# Patient Record
Sex: MALE | Race: WHITE | HISPANIC OR LATINO | Employment: FULL TIME | URBAN - METROPOLITAN AREA
[De-identification: names, ages, dates, MRNs, and addresses within clinical notes are randomized per-mention and may not be internally consistent; named-entity substitution may affect disease eponyms.]

---

## 2021-11-28 ENCOUNTER — APPOINTMENT (OUTPATIENT)
Dept: RADIOLOGY | Facility: MEDICAL CENTER | Age: 51
DRG: 417 | End: 2021-11-28
Attending: STUDENT IN AN ORGANIZED HEALTH CARE EDUCATION/TRAINING PROGRAM
Payer: COMMERCIAL

## 2021-11-28 ENCOUNTER — APPOINTMENT (OUTPATIENT)
Dept: RADIOLOGY | Facility: MEDICAL CENTER | Age: 51
DRG: 417 | End: 2021-11-28
Attending: EMERGENCY MEDICINE
Payer: COMMERCIAL

## 2021-11-28 ENCOUNTER — HOSPITAL ENCOUNTER (INPATIENT)
Facility: MEDICAL CENTER | Age: 51
LOS: 5 days | DRG: 417 | End: 2021-12-03
Attending: EMERGENCY MEDICINE | Admitting: HOSPITALIST
Payer: COMMERCIAL

## 2021-11-28 DIAGNOSIS — K81.9 CHOLECYSTITIS: ICD-10-CM

## 2021-11-28 PROBLEM — R73.9 HYPERGLYCEMIA: Status: ACTIVE | Noted: 2021-11-28

## 2021-11-28 PROBLEM — E66.01 MORBID OBESITY (HCC): Status: ACTIVE | Noted: 2021-11-28

## 2021-11-28 PROBLEM — R74.01 TRANSAMINITIS: Status: ACTIVE | Noted: 2021-11-28

## 2021-11-28 LAB
ALBUMIN SERPL BCP-MCNC: 4.5 G/DL (ref 3.2–4.9)
ALBUMIN/GLOB SERPL: 1.1 G/DL
ALP SERPL-CCNC: 307 U/L (ref 30–99)
ALT SERPL-CCNC: 329 U/L (ref 2–50)
ANION GAP SERPL CALC-SCNC: 12 MMOL/L (ref 7–16)
AST SERPL-CCNC: 476 U/L (ref 12–45)
BASOPHILS # BLD AUTO: 0.2 % (ref 0–1.8)
BASOPHILS # BLD: 0.02 K/UL (ref 0–0.12)
BILIRUB CONJ SERPL-MCNC: 4 MG/DL (ref 0.1–0.5)
BILIRUB INDIRECT SERPL-MCNC: 1.5 MG/DL (ref 0–1)
BILIRUB SERPL-MCNC: 5.5 MG/DL (ref 0.1–1.5)
BLOOD CULTURE HOLD CXBCH: NORMAL
BUN SERPL-MCNC: 12 MG/DL (ref 8–22)
CALCIUM SERPL-MCNC: 10 MG/DL (ref 8.5–10.5)
CHLORIDE SERPL-SCNC: 96 MMOL/L (ref 96–112)
CO2 SERPL-SCNC: 22 MMOL/L (ref 20–33)
CREAT SERPL-MCNC: 0.72 MG/DL (ref 0.5–1.4)
EKG IMPRESSION: NORMAL
EOSINOPHIL # BLD AUTO: 0 K/UL (ref 0–0.51)
EOSINOPHIL NFR BLD: 0 % (ref 0–6.9)
ERYTHROCYTE [DISTWIDTH] IN BLOOD BY AUTOMATED COUNT: 39.5 FL (ref 35.9–50)
EST. AVERAGE GLUCOSE BLD GHB EST-MCNC: 120 MG/DL
GLOBULIN SER CALC-MCNC: 4 G/DL (ref 1.9–3.5)
GLUCOSE BLD-MCNC: 161 MG/DL (ref 65–99)
GLUCOSE SERPL-MCNC: 196 MG/DL (ref 65–99)
HBA1C MFR BLD: 5.8 % (ref 4–5.6)
HCT VFR BLD AUTO: 52.4 % (ref 42–52)
HGB BLD-MCNC: 18.4 G/DL (ref 14–18)
IMM GRANULOCYTES # BLD AUTO: 0.05 K/UL (ref 0–0.11)
IMM GRANULOCYTES NFR BLD AUTO: 0.4 % (ref 0–0.9)
LIPASE SERPL-CCNC: 17 U/L (ref 11–82)
LYMPHOCYTES # BLD AUTO: 1.37 K/UL (ref 1–4.8)
LYMPHOCYTES NFR BLD: 10.8 % (ref 22–41)
MCH RBC QN AUTO: 30.5 PG (ref 27–33)
MCHC RBC AUTO-ENTMCNC: 35.1 G/DL (ref 33.7–35.3)
MCV RBC AUTO: 86.9 FL (ref 81.4–97.8)
MONOCYTES # BLD AUTO: 0.68 K/UL (ref 0–0.85)
MONOCYTES NFR BLD AUTO: 5.4 % (ref 0–13.4)
NEUTROPHILS # BLD AUTO: 10.53 K/UL (ref 1.82–7.42)
NEUTROPHILS NFR BLD: 83.2 % (ref 44–72)
NRBC # BLD AUTO: 0 K/UL
NRBC BLD-RTO: 0 /100 WBC
PLATELET # BLD AUTO: 473 K/UL (ref 164–446)
PMV BLD AUTO: 9 FL (ref 9–12.9)
POTASSIUM SERPL-SCNC: 4.7 MMOL/L (ref 3.6–5.5)
PROT SERPL-MCNC: 8.5 G/DL (ref 6–8.2)
RBC # BLD AUTO: 6.03 M/UL (ref 4.7–6.1)
SODIUM SERPL-SCNC: 130 MMOL/L (ref 135–145)
TROPONIN T SERPL-MCNC: <6 NG/L (ref 6–19)
WBC # BLD AUTO: 12.7 K/UL (ref 4.8–10.8)

## 2021-11-28 PROCEDURE — 700102 HCHG RX REV CODE 250 W/ 637 OVERRIDE(OP): Performed by: STUDENT IN AN ORGANIZED HEALTH CARE EDUCATION/TRAINING PROGRAM

## 2021-11-28 PROCEDURE — 700111 HCHG RX REV CODE 636 W/ 250 OVERRIDE (IP): Performed by: EMERGENCY MEDICINE

## 2021-11-28 PROCEDURE — 99285 EMERGENCY DEPT VISIT HI MDM: CPT

## 2021-11-28 PROCEDURE — 93005 ELECTROCARDIOGRAM TRACING: CPT

## 2021-11-28 PROCEDURE — 96366 THER/PROPH/DIAG IV INF ADDON: CPT

## 2021-11-28 PROCEDURE — 700111 HCHG RX REV CODE 636 W/ 250 OVERRIDE (IP): Performed by: STUDENT IN AN ORGANIZED HEALTH CARE EDUCATION/TRAINING PROGRAM

## 2021-11-28 PROCEDURE — 80053 COMPREHEN METABOLIC PANEL: CPT

## 2021-11-28 PROCEDURE — 84484 ASSAY OF TROPONIN QUANT: CPT

## 2021-11-28 PROCEDURE — 93005 ELECTROCARDIOGRAM TRACING: CPT | Performed by: EMERGENCY MEDICINE

## 2021-11-28 PROCEDURE — 82248 BILIRUBIN DIRECT: CPT

## 2021-11-28 PROCEDURE — 82962 GLUCOSE BLOOD TEST: CPT

## 2021-11-28 PROCEDURE — 96365 THER/PROPH/DIAG IV INF INIT: CPT

## 2021-11-28 PROCEDURE — A9270 NON-COVERED ITEM OR SERVICE: HCPCS | Performed by: STUDENT IN AN ORGANIZED HEALTH CARE EDUCATION/TRAINING PROGRAM

## 2021-11-28 PROCEDURE — 83690 ASSAY OF LIPASE: CPT

## 2021-11-28 PROCEDURE — 83036 HEMOGLOBIN GLYCOSYLATED A1C: CPT

## 2021-11-28 PROCEDURE — 71045 X-RAY EXAM CHEST 1 VIEW: CPT

## 2021-11-28 PROCEDURE — 770001 HCHG ROOM/CARE - MED/SURG/GYN PRIV*

## 2021-11-28 PROCEDURE — 99222 1ST HOSP IP/OBS MODERATE 55: CPT | Performed by: SURGERY

## 2021-11-28 PROCEDURE — 700105 HCHG RX REV CODE 258: Performed by: STUDENT IN AN ORGANIZED HEALTH CARE EDUCATION/TRAINING PROGRAM

## 2021-11-28 PROCEDURE — 96372 THER/PROPH/DIAG INJ SC/IM: CPT

## 2021-11-28 PROCEDURE — 76705 ECHO EXAM OF ABDOMEN: CPT

## 2021-11-28 PROCEDURE — 85025 COMPLETE CBC W/AUTO DIFF WBC: CPT

## 2021-11-28 PROCEDURE — 700105 HCHG RX REV CODE 258: Performed by: EMERGENCY MEDICINE

## 2021-11-28 PROCEDURE — 99223 1ST HOSP IP/OBS HIGH 75: CPT | Performed by: STUDENT IN AN ORGANIZED HEALTH CARE EDUCATION/TRAINING PROGRAM

## 2021-11-28 RX ORDER — LISINOPRIL 10 MG/1
10 TABLET ORAL ONCE
Status: COMPLETED | OUTPATIENT
Start: 2021-11-28 | End: 2021-11-28

## 2021-11-28 RX ORDER — HYDROMORPHONE HYDROCHLORIDE 1 MG/ML
0.5 INJECTION, SOLUTION INTRAMUSCULAR; INTRAVENOUS; SUBCUTANEOUS ONCE
Status: COMPLETED | OUTPATIENT
Start: 2021-11-28 | End: 2021-11-28

## 2021-11-28 RX ORDER — OXYCODONE HYDROCHLORIDE AND ACETAMINOPHEN 5; 325 MG/1; MG/1
1 TABLET ORAL EVERY 4 HOURS PRN
Status: DISCONTINUED | OUTPATIENT
Start: 2021-11-28 | End: 2021-12-01

## 2021-11-28 RX ORDER — HYDROMORPHONE HYDROCHLORIDE 1 MG/ML
1 INJECTION, SOLUTION INTRAMUSCULAR; INTRAVENOUS; SUBCUTANEOUS EVERY 4 HOURS PRN
Status: DISCONTINUED | OUTPATIENT
Start: 2021-11-28 | End: 2021-12-03 | Stop reason: HOSPADM

## 2021-11-28 RX ORDER — SODIUM CHLORIDE, SODIUM LACTATE, POTASSIUM CHLORIDE, CALCIUM CHLORIDE 600; 310; 30; 20 MG/100ML; MG/100ML; MG/100ML; MG/100ML
INJECTION, SOLUTION INTRAVENOUS CONTINUOUS
Status: DISCONTINUED | OUTPATIENT
Start: 2021-11-28 | End: 2021-11-29

## 2021-11-28 RX ORDER — ONDANSETRON 2 MG/ML
4 INJECTION INTRAMUSCULAR; INTRAVENOUS ONCE
Status: COMPLETED | OUTPATIENT
Start: 2021-11-28 | End: 2021-11-28

## 2021-11-28 RX ORDER — HEPARIN SODIUM 5000 [USP'U]/ML
5000 INJECTION, SOLUTION INTRAVENOUS; SUBCUTANEOUS EVERY 8 HOURS
Status: DISCONTINUED | OUTPATIENT
Start: 2021-11-28 | End: 2021-12-01

## 2021-11-28 RX ORDER — CALCIUM CARBONATE 500 MG/1
500 TABLET, CHEWABLE ORAL
COMMUNITY

## 2021-11-28 RX ORDER — ACETAMINOPHEN 325 MG/1
650 TABLET ORAL EVERY 6 HOURS PRN
Status: DISCONTINUED | OUTPATIENT
Start: 2021-11-28 | End: 2021-12-01

## 2021-11-28 RX ADMIN — INSULIN HUMAN 3 UNITS: 100 INJECTION, SOLUTION PARENTERAL at 21:17

## 2021-11-28 RX ADMIN — HYDROMORPHONE HYDROCHLORIDE 1 MG: 1 INJECTION, SOLUTION INTRAMUSCULAR; INTRAVENOUS; SUBCUTANEOUS at 20:41

## 2021-11-28 RX ADMIN — ONDANSETRON 4 MG: 2 INJECTION INTRAMUSCULAR; INTRAVENOUS at 17:14

## 2021-11-28 RX ADMIN — LISINOPRIL 10 MG: 10 TABLET ORAL at 22:16

## 2021-11-28 RX ADMIN — HYDROMORPHONE HYDROCHLORIDE 0.5 MG: 1 INJECTION, SOLUTION INTRAMUSCULAR; INTRAVENOUS; SUBCUTANEOUS at 17:52

## 2021-11-28 RX ADMIN — HYDROMORPHONE HYDROCHLORIDE 0.5 MG: 1 INJECTION, SOLUTION INTRAMUSCULAR; INTRAVENOUS; SUBCUTANEOUS at 17:14

## 2021-11-28 RX ADMIN — OXYCODONE HYDROCHLORIDE AND ACETAMINOPHEN 1 TABLET: 5; 325 TABLET ORAL at 22:15

## 2021-11-28 RX ADMIN — SODIUM CHLORIDE, POTASSIUM CHLORIDE, SODIUM LACTATE AND CALCIUM CHLORIDE: 600; 310; 30; 20 INJECTION, SOLUTION INTRAVENOUS at 20:46

## 2021-11-28 RX ADMIN — PIPERACILLIN AND TAZOBACTAM 4.5 G: 4; .5 INJECTION, POWDER, LYOPHILIZED, FOR SOLUTION INTRAVENOUS; PARENTERAL at 18:37

## 2021-11-28 RX ADMIN — HEPARIN SODIUM 5000 UNITS: 5000 INJECTION, SOLUTION INTRAVENOUS; SUBCUTANEOUS at 21:17

## 2021-11-28 RX ADMIN — PIPERACILLIN AND TAZOBACTAM 4.5 G: 4; .5 INJECTION, POWDER, LYOPHILIZED, FOR SOLUTION INTRAVENOUS; PARENTERAL at 22:21

## 2021-11-28 ASSESSMENT — ENCOUNTER SYMPTOMS
MUSCULOSKELETAL NEGATIVE: 1
ABDOMINAL PAIN: 1
EYES NEGATIVE: 1
DIAPHORESIS: 0
CHILLS: 1
WEIGHT LOSS: 0
NAUSEA: 1
RESPIRATORY NEGATIVE: 1
NEUROLOGICAL NEGATIVE: 1
CARDIOVASCULAR NEGATIVE: 1
VOMITING: 1
PSYCHIATRIC NEGATIVE: 1

## 2021-11-28 ASSESSMENT — PAIN DESCRIPTION - PAIN TYPE
TYPE: ACUTE PAIN

## 2021-11-29 PROBLEM — K76.0 HEPATIC STEATOSIS: Status: ACTIVE | Noted: 2021-11-29

## 2021-11-29 PROBLEM — K80.50 CHOLEDOCHOLITHIASIS: Status: ACTIVE | Noted: 2021-11-29

## 2021-11-29 LAB
ALBUMIN SERPL BCP-MCNC: 3.7 G/DL (ref 3.2–4.9)
ALBUMIN/GLOB SERPL: 1.2 G/DL
ALP SERPL-CCNC: 319 U/L (ref 30–99)
ALT SERPL-CCNC: 363 U/L (ref 2–50)
ANION GAP SERPL CALC-SCNC: 10 MMOL/L (ref 7–16)
AST SERPL-CCNC: 265 U/L (ref 12–45)
BASOPHILS # BLD AUTO: 0.3 % (ref 0–1.8)
BASOPHILS # BLD: 0.04 K/UL (ref 0–0.12)
BILIRUB SERPL-MCNC: 7.5 MG/DL (ref 0.1–1.5)
BUN SERPL-MCNC: 10 MG/DL (ref 8–22)
CALCIUM SERPL-MCNC: 9.1 MG/DL (ref 8.5–10.5)
CFT BLD TEG: 5.3 MIN (ref 4.6–9.1)
CFT P HPASE BLD TEG: 6.4 MIN (ref 4.3–8.3)
CHLORIDE SERPL-SCNC: 99 MMOL/L (ref 96–112)
CLOT ANGLE BLD TEG: 76.3 DEGREES (ref 63–78)
CLOT LYSIS 30M P MA LENFR BLD TEG: 0 % (ref 0–2.6)
CO2 SERPL-SCNC: 27 MMOL/L (ref 20–33)
CREAT SERPL-MCNC: 0.66 MG/DL (ref 0.5–1.4)
CT.EXTRINSIC BLD ROTEM: 1 MIN (ref 0.8–2.1)
EOSINOPHIL # BLD AUTO: 0 K/UL (ref 0–0.51)
EOSINOPHIL NFR BLD: 0 % (ref 0–6.9)
ERYTHROCYTE [DISTWIDTH] IN BLOOD BY AUTOMATED COUNT: 41.6 FL (ref 35.9–50)
GLOBULIN SER CALC-MCNC: 3.1 G/DL (ref 1.9–3.5)
GLUCOSE BLD-MCNC: 115 MG/DL (ref 65–99)
GLUCOSE BLD-MCNC: 154 MG/DL (ref 65–99)
GLUCOSE BLD-MCNC: 163 MG/DL (ref 65–99)
GLUCOSE SERPL-MCNC: 137 MG/DL (ref 65–99)
HCT VFR BLD AUTO: 48.4 % (ref 42–52)
HGB BLD-MCNC: 16.5 G/DL (ref 14–18)
IMM GRANULOCYTES # BLD AUTO: 0.05 K/UL (ref 0–0.11)
IMM GRANULOCYTES NFR BLD AUTO: 0.4 % (ref 0–0.9)
INR PPP: 1.15 (ref 0.87–1.13)
LYMPHOCYTES # BLD AUTO: 1.11 K/UL (ref 1–4.8)
LYMPHOCYTES NFR BLD: 9 % (ref 22–41)
MCF BLD TEG: 68.3 MM (ref 52–69)
MCF.PLATELET INHIB BLD ROTEM: 31.5 MM (ref 15–32)
MCH RBC QN AUTO: 30.3 PG (ref 27–33)
MCHC RBC AUTO-ENTMCNC: 34.1 G/DL (ref 33.7–35.3)
MCV RBC AUTO: 89 FL (ref 81.4–97.8)
MONOCYTES # BLD AUTO: 1.07 K/UL (ref 0–0.85)
MONOCYTES NFR BLD AUTO: 8.7 % (ref 0–13.4)
NEUTROPHILS # BLD AUTO: 10.04 K/UL (ref 1.82–7.42)
NEUTROPHILS NFR BLD: 81.6 % (ref 44–72)
NRBC # BLD AUTO: 0 K/UL
NRBC BLD-RTO: 0 /100 WBC
PA AA BLD-ACNC: 6 % (ref 0–11)
PA ADP BLD-ACNC: 19.9 % (ref 0–17)
PLATELET # BLD AUTO: 396 K/UL (ref 164–446)
PMV BLD AUTO: 9 FL (ref 9–12.9)
POTASSIUM SERPL-SCNC: 4.4 MMOL/L (ref 3.6–5.5)
PROT SERPL-MCNC: 6.8 G/DL (ref 6–8.2)
PROTHROMBIN TIME: 14.4 SEC (ref 12–14.6)
RBC # BLD AUTO: 5.44 M/UL (ref 4.7–6.1)
SARS-COV+SARS-COV-2 AG RESP QL IA.RAPID: NOTDETECTED
SODIUM SERPL-SCNC: 136 MMOL/L (ref 135–145)
SPECIMEN SOURCE: NORMAL
TEG ALGORITHM TGALG: ABNORMAL
WBC # BLD AUTO: 12.3 K/UL (ref 4.8–10.8)

## 2021-11-29 PROCEDURE — 85384 FIBRINOGEN ACTIVITY: CPT

## 2021-11-29 PROCEDURE — 96372 THER/PROPH/DIAG INJ SC/IM: CPT

## 2021-11-29 PROCEDURE — 85347 COAGULATION TIME ACTIVATED: CPT

## 2021-11-29 PROCEDURE — 99232 SBSQ HOSP IP/OBS MODERATE 35: CPT | Performed by: SURGERY

## 2021-11-29 PROCEDURE — 80053 COMPREHEN METABOLIC PANEL: CPT

## 2021-11-29 PROCEDURE — 85610 PROTHROMBIN TIME: CPT

## 2021-11-29 PROCEDURE — 85025 COMPLETE CBC W/AUTO DIFF WBC: CPT

## 2021-11-29 PROCEDURE — 82962 GLUCOSE BLOOD TEST: CPT

## 2021-11-29 PROCEDURE — 770001 HCHG ROOM/CARE - MED/SURG/GYN PRIV*

## 2021-11-29 PROCEDURE — 700111 HCHG RX REV CODE 636 W/ 250 OVERRIDE (IP): Performed by: STUDENT IN AN ORGANIZED HEALTH CARE EDUCATION/TRAINING PROGRAM

## 2021-11-29 PROCEDURE — 700105 HCHG RX REV CODE 258: Performed by: STUDENT IN AN ORGANIZED HEALTH CARE EDUCATION/TRAINING PROGRAM

## 2021-11-29 PROCEDURE — 36415 COLL VENOUS BLD VENIPUNCTURE: CPT

## 2021-11-29 PROCEDURE — 700105 HCHG RX REV CODE 258: Performed by: HOSPITALIST

## 2021-11-29 PROCEDURE — 74181 MRI ABDOMEN W/O CONTRAST: CPT

## 2021-11-29 PROCEDURE — 85576 BLOOD PLATELET AGGREGATION: CPT

## 2021-11-29 PROCEDURE — 99233 SBSQ HOSP IP/OBS HIGH 50: CPT | Performed by: HOSPITALIST

## 2021-11-29 PROCEDURE — 700102 HCHG RX REV CODE 250 W/ 637 OVERRIDE(OP): Performed by: STUDENT IN AN ORGANIZED HEALTH CARE EDUCATION/TRAINING PROGRAM

## 2021-11-29 PROCEDURE — 87426 SARSCOV CORONAVIRUS AG IA: CPT

## 2021-11-29 PROCEDURE — 96366 THER/PROPH/DIAG IV INF ADDON: CPT

## 2021-11-29 RX ORDER — HYDROMORPHONE HYDROCHLORIDE 1 MG/ML
1 INJECTION, SOLUTION INTRAMUSCULAR; INTRAVENOUS; SUBCUTANEOUS ONCE
Status: COMPLETED | OUTPATIENT
Start: 2021-11-29 | End: 2021-11-29

## 2021-11-29 RX ORDER — DEXTROSE, SODIUM CHLORIDE, SODIUM LACTATE, POTASSIUM CHLORIDE, AND CALCIUM CHLORIDE 5; .6; .31; .03; .02 G/100ML; G/100ML; G/100ML; G/100ML; G/100ML
INJECTION, SOLUTION INTRAVENOUS CONTINUOUS
Status: DISCONTINUED | OUTPATIENT
Start: 2021-11-29 | End: 2021-12-03 | Stop reason: HOSPADM

## 2021-11-29 RX ADMIN — INSULIN HUMAN 3 UNITS: 100 INJECTION, SOLUTION PARENTERAL at 20:48

## 2021-11-29 RX ADMIN — FENTANYL CITRATE 25 MCG: 50 INJECTION, SOLUTION INTRAMUSCULAR; INTRAVENOUS at 00:29

## 2021-11-29 RX ADMIN — INSULIN HUMAN 3 UNITS: 100 INJECTION, SOLUTION PARENTERAL at 17:33

## 2021-11-29 RX ADMIN — SODIUM CHLORIDE, SODIUM LACTATE, POTASSIUM CHLORIDE, CALCIUM CHLORIDE AND DEXTROSE MONOHYDRATE: 5; 600; 310; 30; 20 INJECTION, SOLUTION INTRAVENOUS at 15:42

## 2021-11-29 RX ADMIN — PIPERACILLIN AND TAZOBACTAM 4.5 G: 4; .5 INJECTION, POWDER, LYOPHILIZED, FOR SOLUTION INTRAVENOUS; PARENTERAL at 13:40

## 2021-11-29 RX ADMIN — SODIUM CHLORIDE, POTASSIUM CHLORIDE, SODIUM LACTATE AND CALCIUM CHLORIDE: 600; 310; 30; 20 INJECTION, SOLUTION INTRAVENOUS at 04:51

## 2021-11-29 RX ADMIN — PIPERACILLIN AND TAZOBACTAM 4.5 G: 4; .5 INJECTION, POWDER, LYOPHILIZED, FOR SOLUTION INTRAVENOUS; PARENTERAL at 20:42

## 2021-11-29 RX ADMIN — SODIUM CHLORIDE, SODIUM LACTATE, POTASSIUM CHLORIDE, CALCIUM CHLORIDE AND DEXTROSE MONOHYDRATE: 5; 600; 310; 30; 20 INJECTION, SOLUTION INTRAVENOUS at 23:45

## 2021-11-29 RX ADMIN — HYDROMORPHONE HYDROCHLORIDE 1 MG: 1 INJECTION, SOLUTION INTRAMUSCULAR; INTRAVENOUS; SUBCUTANEOUS at 04:51

## 2021-11-29 RX ADMIN — PIPERACILLIN AND TAZOBACTAM 4.5 G: 4; .5 INJECTION, POWDER, LYOPHILIZED, FOR SOLUTION INTRAVENOUS; PARENTERAL at 04:52

## 2021-11-29 RX ADMIN — HYDROMORPHONE HYDROCHLORIDE 1 MG: 1 INJECTION, SOLUTION INTRAMUSCULAR; INTRAVENOUS; SUBCUTANEOUS at 01:47

## 2021-11-29 RX ADMIN — SODIUM CHLORIDE, POTASSIUM CHLORIDE, SODIUM LACTATE AND CALCIUM CHLORIDE: 600; 310; 30; 20 INJECTION, SOLUTION INTRAVENOUS at 11:36

## 2021-11-29 RX ADMIN — HYDROMORPHONE HYDROCHLORIDE 1 MG: 1 INJECTION, SOLUTION INTRAMUSCULAR; INTRAVENOUS; SUBCUTANEOUS at 12:58

## 2021-11-29 RX ADMIN — HYDROMORPHONE HYDROCHLORIDE 1 MG: 1 INJECTION, SOLUTION INTRAMUSCULAR; INTRAVENOUS; SUBCUTANEOUS at 17:36

## 2021-11-29 RX ADMIN — HYDROMORPHONE HYDROCHLORIDE 1 MG: 1 INJECTION, SOLUTION INTRAMUSCULAR; INTRAVENOUS; SUBCUTANEOUS at 06:23

## 2021-11-29 RX ADMIN — HEPARIN SODIUM 5000 UNITS: 5000 INJECTION, SOLUTION INTRAVENOUS; SUBCUTANEOUS at 06:22

## 2021-11-29 ASSESSMENT — ENCOUNTER SYMPTOMS
CHILLS: 0
WEAKNESS: 1
COUGH: 0
HEMOPTYSIS: 0
CARDIOVASCULAR NEGATIVE: 1
SPUTUM PRODUCTION: 0
NAUSEA: 1
MUSCULOSKELETAL NEGATIVE: 1
PALPITATIONS: 0
CLAUDICATION: 0
VOMITING: 0
FOCAL WEAKNESS: 0
ORTHOPNEA: 0
FEVER: 0
EYES NEGATIVE: 1
CHILLS: 1
DOUBLE VISION: 0
HEARTBURN: 0
DEPRESSION: 0
BLURRED VISION: 0
SHORTNESS OF BREATH: 1
SORE THROAT: 0
PSYCHIATRIC NEGATIVE: 1
RESPIRATORY NEGATIVE: 1
NERVOUS/ANXIOUS: 1
HEADACHES: 0
ABDOMINAL PAIN: 1

## 2021-11-29 ASSESSMENT — COGNITIVE AND FUNCTIONAL STATUS - GENERAL
MOVING FROM LYING ON BACK TO SITTING ON SIDE OF FLAT BED: A LITTLE
STANDING UP FROM CHAIR USING ARMS: A LITTLE
DAILY ACTIVITIY SCORE: 23
SUGGESTED CMS G CODE MODIFIER DAILY ACTIVITY: CI
CLIMB 3 TO 5 STEPS WITH RAILING: A LITTLE
MOVING TO AND FROM BED TO CHAIR: A LITTLE
MOBILITY SCORE: 19
WALKING IN HOSPITAL ROOM: A LITTLE
TOILETING: A LITTLE
SUGGESTED CMS G CODE MODIFIER MOBILITY: CK

## 2021-11-29 ASSESSMENT — LIFESTYLE VARIABLES
HAVE YOU EVER FELT YOU SHOULD CUT DOWN ON YOUR DRINKING: NO
ON A TYPICAL DAY WHEN YOU DRINK ALCOHOL HOW MANY DRINKS DO YOU HAVE: 0
TOTAL SCORE: 0
TOTAL SCORE: 0
HAVE PEOPLE ANNOYED YOU BY CRITICIZING YOUR DRINKING: NO
ALCOHOL_USE: NO
EVER FELT BAD OR GUILTY ABOUT YOUR DRINKING: NO
TOTAL SCORE: 0
CONSUMPTION TOTAL: NEGATIVE
HOW MANY TIMES IN THE PAST YEAR HAVE YOU HAD 5 OR MORE DRINKS IN A DAY: 0
AVERAGE NUMBER OF DAYS PER WEEK YOU HAVE A DRINK CONTAINING ALCOHOL: 0
EVER HAD A DRINK FIRST THING IN THE MORNING TO STEADY YOUR NERVES TO GET RID OF A HANGOVER: NO

## 2021-11-29 ASSESSMENT — PAIN DESCRIPTION - PAIN TYPE
TYPE: ACUTE PAIN

## 2021-11-29 ASSESSMENT — PATIENT HEALTH QUESTIONNAIRE - PHQ9
2. FEELING DOWN, DEPRESSED, IRRITABLE, OR HOPELESS: NOT AT ALL
SUM OF ALL RESPONSES TO PHQ9 QUESTIONS 1 AND 2: 0
1. LITTLE INTEREST OR PLEASURE IN DOING THINGS: NOT AT ALL

## 2021-11-29 NOTE — ASSESSMENT & PLAN NOTE
Admit patient to surgical floor  Blood Cx X 2  ngtd, General surgery following    -- continue iV abx   -Lap shonna on 12/2, possible discharge tonight or tomorrow AM

## 2021-11-29 NOTE — PROGRESS NOTES
Dr. Gonzalez notified that patient's abdominal pain remains a 10/10 after Percocet and Dilaudid administration.  New pain order to follow.

## 2021-11-29 NOTE — CONSULTS
"    DATE OF CONSULTATION:  11/28/2021     REFERRING PHYSICIAN:   Ravinder Kc M.D.     CONSULTING PHYSICIAN:  Salena Clemons M.D.     REASON FOR CONSULTATION:  Cholecystitis, possible choledocholithiasis.  .   I have been asked by  to see the patient in surgical consultation for evaluation of cholecystitis.    HISTORY OF PRESENT ILLNESS: The patient is a 51 year-old  man who presents to the Emergency Department with a 4- week history of moderate epigastric abdominal pain. The pain is associated with abdominal distension. The patient denies any recent or intercurrent illness. The patient denies any history of previous abdominal surgery.      PAST MEDICAL HISTORY:      PAST SURGICAL HISTORY: patient denies any surgical history     ALLERGIES: No Known Allergies     CURRENT MEDICATIONS:   Home Medications    **Home medications have not yet been reviewed for this encounter**         FAMILY HISTORY: No family history on file.    SOCIAL HISTORY:   Social History     Tobacco Use   • Smoking status: Current Every Day Smoker     Packs/day: 0.25     Types: Cigarettes   • Smokeless tobacco: Not on file   Substance and Sexual Activity   • Alcohol use: Never   • Drug use: Never   • Sexual activity: Not on file       REVIEW OF SYSTEMS: Comprehensive review of systems is negative with the exception of the aforementioned HPI, PMH, and PSH bullets in accordance with CMS guidelines.     PHYSICAL EXAMINATION:   General Appearance: The patient is a pleasant  man in mild distress.  VITAL SIGNS: BP (!) 162/89   Pulse 76   Temp 36.1 °C (97 °F) (Temporal)   Resp 18   Ht 1.651 m (5' 5\")   Wt 89.9 kg (198 lb 3.1 oz)   SpO2 92%   HEAD AND NECK: Demonstrates symmetric, reactive pupils. Icteric. Nares and oropharynx are clear.   NECK: Supple.    CHEST:    Inspection: Unlabored respirations, no intercostal retractions, paradoxical motion, or accessory muscle use.    CARDIOVASCULAR:   Inspection: The skin is warm.     "   ABDOMEN:   Inspection: Abdominal inspection reveals no scars..   Palpation: Palpation is remarkable for moderate tenderness in the epigastric region.    EXTREMITIES:   Examination of the upper and lower extremities demonstrates No cyanosis, edema, or clubbing of the nails.  NEUROLOGIC:   Neurologic examination reveals no focal deficits noted.       LABORATORY VALUES:   Recent Labs     11/28/21  1613   WBC 12.7*   RBC 6.03   HEMOGLOBIN 18.4*   HEMATOCRIT 52.4*   MCV 86.9   MCH 30.5   MCHC 35.1   RDW 39.5   PLATELETCT 473*   MPV 9.0     Recent Labs     11/28/21  1613   SODIUM 130*   POTASSIUM 4.7   CHLORIDE 96   CO2 22   GLUCOSE 196*   BUN 12   CREATININE 0.72   CALCIUM 10.0     Recent Labs     11/28/21  1613   ASTSGOT 476*   ALTSGPT 329*   TBILIRUBIN 5.5*   ALKPHOSPHAT 307*   GLOBULIN 4.0*            IMAGING:   US-RUQ   Final Result      1.  Gallbladder sludge and stones with mild wall thickening and positive sonographic Bazzi's sign is suspicious for acute cholecystitis.   2.  Hepatic steatosis.      DX-CHEST-PORTABLE (1 VIEW)   Final Result      No evidence of acute cardiopulmonary process.          IMPRESSION AND PLAN:  Cholecystitis without cholelithiasis- (present on admission)  Assessment & Plan  US shows Gallbladder sludge and stones with mild wall thickening and positive sonographic Bazzi's sign is suspicious for acute cholecystitis  LFTs elevated   MRCP pending  IV abx    Will need lap shonna pending MRCP results.       ____________________________________     Salena Clemons M.D.    DD: 11/28/2021  6:11 PM

## 2021-11-29 NOTE — ED NOTES
Received report form RN. Patient resting in bed. Patient updated on plan of care. Patient understands plan of care.

## 2021-11-29 NOTE — ASSESSMENT & PLAN NOTE
US shows Gallbladder sludge and stones with mild wall thickening and positive sonographic Bazzi's sign is suspicious for acute cholecystitis  LFTs elevated   MRCP pending  IV abx

## 2021-11-29 NOTE — PROGRESS NOTES
Received report. Assumed care at 1900. This pt is AOx4, 10/10 pain. Patient and RN discussed plan of care: questions answered. Labs noted, assessment complete. Pt is on room air. Call light in place, fall precautions in place, patient educated on importance of calling for assistance. No additional needs at this time.

## 2021-11-29 NOTE — ASSESSMENT & PLAN NOTE
No discrete signal void within the common bile duct to indicate stone, however heterogeneous decreased T2 signal may indicate sludge or debris.  -LFT's continue to improve  -ERCP on 11/30, showed small stone and biliary sludge  -empiric IV zosyn continues

## 2021-11-29 NOTE — ASSESSMENT & PLAN NOTE
Admission T bili 5.5  11/29 T bili 7.5  MRCP:  No discrete signal void within the common bile duct to indicate stone, decreased T2 signal may indicate sludge or debris. No significant biliary dilation.  11/30 ERCP  Shay Carlos M.D. Gastroenterology.

## 2021-11-29 NOTE — ED PROVIDER NOTES
"ED Provider Note    Scribed for Ravinder Kc M.D. by Elias You. 11/28/2021, 4:30 PM.    Primary care provider: No primary care provider noted.  Means of arrival: Walk-in  History obtained from: Patient  History limited by: None    CHIEF COMPLAINT  Chief Complaint   Patient presents with   • Epigastric Pain   • Other     [facility] and [facility] told to come to the ER for possible gallbladder issue     HPI  Alec Mrurell is a 51 y.o. male who presents to the Emergency Department for worsening epigastric abdominal pain onset 4 weeks ago. His abdominal pain is exacerbated by taking breaths and eating. No alleviating factors were identified. He denies associated vomiting, diarrhea, or fevers. He has a history of smoking. He denies alcohol use.    REVIEW OF SYSTEMS  Pertinent positives include epigastric abdominal pain.  Pertinent negatives include no vomiting, diarrhea, or fevers.    All other systems reviewed and negative.    PAST MEDICAL HISTORY   None noted.    SURGICAL HISTORY  patient denies any surgical history    SOCIAL HISTORY  Social History     Tobacco Use   • Smoking status: Current Every Day Smoker     Packs/day: 0.25     Types: Cigarettes   • Smokeless tobacco: None noted.   Substance Use Topics   • Alcohol use: Never   • Drug use: Never      Social History     Substance and Sexual Activity   Drug Use Never     FAMILY HISTORY  No family history noted.    CURRENT MEDICATIONS  Home Medications    **Home medications have not yet been reviewed for this encounter**       ALLERGIES  No Known Allergies    PHYSICAL EXAM  VITAL SIGNS: BP (!) 185/92   Pulse 68   Temp 36.1 °C (97 °F) (Temporal)   Resp 18   Ht 1.651 m (5' 5\")   Wt 89.9 kg (198 lb 3.1 oz)   SpO2 97%   BMI 32.98 kg/m²   Constitutional: Well developed, Well nourished, No acute distress, Non-toxic appearance.   HENT: Normocephalic, Atraumatic, TMs normal, mucous membranes moist, no erythema, exudates, swelling, or masses, nares " patent  Eyes: nonicteric  Neck: Supple, no meningismus  Lymphatic: No lymphadenopathy noted.   Cardiovascular: Regular rate and rhythm, no gallops rubs or murmurs  Lungs: Clear bilaterally   Abdomen: Bowel sounds normal, Soft, No tenderness  Skin: Warm, Dry, no rash  Back: No tenderness, No CVA tenderness.   Genitalia: Deferred  Rectal: Deferred  Extremities: No edema  Neurologic: Alert, appropriate, follows commands, moving all extremities, normal speech   Psychiatric: Affect normal    DIAGNOSTIC STUDIES / PROCEDURES    LABS  Results for orders placed or performed during the hospital encounter of 11/28/21   CBC with Differential   Result Value Ref Range    WBC 12.7 (H) 4.8 - 10.8 K/uL    RBC 6.03 4.70 - 6.10 M/uL    Hemoglobin 18.4 (H) 14.0 - 18.0 g/dL    Hematocrit 52.4 (H) 42.0 - 52.0 %    MCV 86.9 81.4 - 97.8 fL    MCH 30.5 27.0 - 33.0 pg    MCHC 35.1 33.7 - 35.3 g/dL    RDW 39.5 35.9 - 50.0 fL    Platelet Count 473 (H) 164 - 446 K/uL    MPV 9.0 9.0 - 12.9 fL    Neutrophils-Polys 83.20 (H) 44.00 - 72.00 %    Lymphocytes 10.80 (L) 22.00 - 41.00 %    Monocytes 5.40 0.00 - 13.40 %    Eosinophils 0.00 0.00 - 6.90 %    Basophils 0.20 0.00 - 1.80 %    Immature Granulocytes 0.40 0.00 - 0.90 %    Nucleated RBC 0.00 /100 WBC    Neutrophils (Absolute) 10.53 (H) 1.82 - 7.42 K/uL    Lymphs (Absolute) 1.37 1.00 - 4.80 K/uL    Monos (Absolute) 0.68 0.00 - 0.85 K/uL    Eos (Absolute) 0.00 0.00 - 0.51 K/uL    Baso (Absolute) 0.02 0.00 - 0.12 K/uL    Immature Granulocytes (abs) 0.05 0.00 - 0.11 K/uL    NRBC (Absolute) 0.00 K/uL   Complete Metabolic Panel (CMP)   Result Value Ref Range    Sodium 130 (L) 135 - 145 mmol/L    Potassium 4.7 3.6 - 5.5 mmol/L    Chloride 96 96 - 112 mmol/L    Co2 22 20 - 33 mmol/L    Anion Gap 12.0 7.0 - 16.0    Glucose 196 (H) 65 - 99 mg/dL    Bun 12 8 - 22 mg/dL    Creatinine 0.72 0.50 - 1.40 mg/dL    Calcium 10.0 8.5 - 10.5 mg/dL    AST(SGOT) 476 (H) 12 - 45 U/L    ALT(SGPT) 329 (H) 2 - 50 U/L     Alkaline Phosphatase 307 (H) 30 - 99 U/L    Total Bilirubin 5.5 (H) 0.1 - 1.5 mg/dL    Albumin 4.5 3.2 - 4.9 g/dL    Total Protein 8.5 (H) 6.0 - 8.2 g/dL    Globulin 4.0 (H) 1.9 - 3.5 g/dL    A-G Ratio 1.1 g/dL   Troponin   Result Value Ref Range    Troponin T <6 6 - 19 ng/L   LIPASE   Result Value Ref Range    Lipase 17 11 - 82 U/L   ESTIMATED GFR   Result Value Ref Range    GFR If African American >60 >60 mL/min/1.73 m 2    GFR If Non African American >60 >60 mL/min/1.73 m 2   EKG   Result Value Ref Range    Report       [facility] Emergency Dept.    Test Date:  2021  Pt Name:    LUCY CASTRO                Department: ER  MRN:        [id number]                      Room:       [facility]  Gender:     Male                         Technician: [id number]  :        [id number]                   Requested By:ER TRIAGE PROTOCOL  Order #:    [id number]                    Reading MD:    Measurements  Intervals                                Axis  Rate:       62                           P:          0  AL:         111                          QRS:        29  QRSD:       80                           T:          57  QT:         412  QTc:        419    Interpretive Statements  SINUS RHYTHM  BORDERLINE SHORT AL INTERVAL  No previous ECG available for comparison     All labs reviewed by me.    EKG  Obtained at 4:19 PM  Normal Sinus rhythm   Rate 62  Axis normal   Intervals normal   No ST segment elevation or depression.      RADIOLOGY  US-RUQ   Final Result      1.  Gallbladder sludge and stones with mild wall thickening and positive sonographic Bazzi's sign is suspicious for acute cholecystitis.   2.  Hepatic steatosis.      DX-CHEST-PORTABLE (1 VIEW)   Final Result      No evidence of acute cardiopulmonary process.        The radiologist's interpretation of all radiological studies have been reviewed by me.    COURSE & MEDICAL DECISION MAKING  Nursing notes, VS, PMSFHx reviewed in chart.     4:30 PM Patient  seen and examined at bedside. Ordered for US-RUQ, DX-chest, CBC w/ diff, CMP, Troponin, Lipase, and EKG to evaluate. Patient was treated with Zofran 4mg and Dilaudid 0.5mg for his symptoms.    5:46 PM Ultrasound results indicate possible acute cholecystitis. Paged general surgery. Patient will be treated with Dilaudid 0.5mg and Zosyn 4.5g in NS.    5:56 PM Paged surgery.    6:00 PM I reevaluated the patient at bedside. I informed the patient of my plan to admit today given the patient's current presentation and diagnostic study results. Patient verbalizes understanding and support with my plan for admission.    6:06 PM I discussed the patient's case and the above findings with Dr. Clemons (General surgery) who agreed to see the patient and wants the patient admitted. Paged hospitalist.    6:14 PM I discussed the patient's case and the above findings with Dr. Gonzalez (Hospitalist) who agreed to evaluate the patient.    Decision Making:  This is a 51 y.o. year old male who presents with what appears to be called he has a gallbladder with stones and sludge.  There is a thickened wall.  Transaminases are elevated.  Bilirubin is elevated.  Case discussed with Dr. Clemons who will consult.  We will obtain MRCP-hospitalist has been consulted for medical admission in regards to this.    DISPOSITION:  Patient will be hospitalized by Lisa in guarded condition.    FINAL IMPRESSION  1. Cholecystitis          Elias MONCADA (Shalini), am scribing for, and in the presence of, Ravinder Kc M.D..    Electronically signed by: Elias You (Shalini), 11/28/2021    Ravinder MONCADA M.D. personally performed the services described in this documentation, as scribed by Elias You in my presence, and it is both accurate and complete. C.    The note accurately reflects work and decisions made by me.  Ravinder Kc M.D.  11/28/2021  6:16 PM

## 2021-11-29 NOTE — PROGRESS NOTES
Dr. Gonzalez notified that patient has a BP of 168/98. Lisinopril 10 mg given.  Percocet also given for pain.  Lisa stated ok to give meds with sip of water

## 2021-11-29 NOTE — PROGRESS NOTES
Dr. Luis notified that patient's pain is not being well controlled with current pain management.  New order placed.

## 2021-11-29 NOTE — PROGRESS NOTES
Patient's daughter left bedside.  Patient's daughter stated patient remains to be in a significant amount of pain.

## 2021-11-29 NOTE — CONSULTS
"Gastroenterology Consult Note:    JAMMIE Durbin  Date & Time note created:    11/29/2021   3:18 PM     Referring MD:  Dr. Maximo Cho    Patient ID:  Name:             Alec Murrell     YOB: 1970  Age:                 51 y.o.  male   MRN:               [id number]                                                             Reason for Consult:      1. Epigastric abdominal pain  2. Elevated LFT's    History of Present Illness:    This is a 51-year-old male, PMH tobacco abuse, asthma who was admitted to the hospital 11/28/2021 with a 1 month history of severe epigastric abdominal pain, nausea, chills.  He was seen in the urgent care x2 as well as his primary care physician's office.  He was prescribed pain medication which was not helpful.  In the ER, he was hypertensive, WBC 12.7, total bilirubin 4.5.  .  .  Alkaline phosphatase 307.  Abdominal ultrasound-gallbladder sludge and stones with mild wall thickening, positive Bazzi sign suspicious for acute cholecystitis.  Hepatic steatosis.  Common bile duct measures 5 mm.  General surgery was consulted who recommended an MRCP.    MRCP 11/29/2021:  Gallbladder wall thickening with stones and probable sludge consistent with acute cholecystitis.  2.  No discrete signal void within the common bile duct to indicate stone, however heterogeneous decreased T2 signal may indicate sludge or debris.     3.  No significant biliary dilation.    Currently, continues to complain of \"severe\" intermittent right upper quadrant/epigastric abdominal pain.  Total bilirubin trending up to 7.5.    Review of Systems:      Review of Systems   Constitutional: Positive for chills and malaise/fatigue.   HENT: Negative.    Eyes: Negative.    Respiratory: Negative.    Cardiovascular: Negative.    Gastrointestinal: Positive for abdominal pain and nausea.   Genitourinary: Negative.    Musculoskeletal: Negative.    Skin: Negative.    Neurological: Positive " for weakness.   Psychiatric/Behavioral: Negative.              Physical Exam:  Vitals/ General Appearance:   Weight/BMI: Body mass index is 32.98 kg/m².    Vitals:    11/29/21 1100 11/29/21 1200 11/29/21 1300 11/29/21 1330   BP: (!) 162/90 157/87 143/89    Pulse: 86 80 93    Resp: 19  18    Temp:   36.9 °C (98.4 °F)    TempSrc:   Temporal    SpO2: 94% 94% 91% 94%   Weight:       Height:         Oxygen Therapy:  Pulse Oximetry: 94 %, O2 (LPM): 2, O2 Delivery Device: Silicone Nasal Cannula    Physical Exam  Vitals and nursing note reviewed.   Constitutional:       Appearance: Normal appearance. He is obese.   HENT:      Head: Normocephalic and atraumatic.      Right Ear: Tympanic membrane normal.      Left Ear: Tympanic membrane normal.      Nose: Nose normal.      Mouth/Throat:      Mouth: Mucous membranes are dry.   Eyes:      General: Scleral icterus present.      Extraocular Movements: Extraocular movements intact.      Pupils: Pupils are equal, round, and reactive to light.   Cardiovascular:      Rate and Rhythm: Normal rate and regular rhythm.      Pulses: Normal pulses.      Heart sounds: Normal heart sounds.   Pulmonary:      Breath sounds: Wheezing (Inspiratory and expiratory wheezing) present.   Abdominal:      General: Bowel sounds are normal.      Palpations: Abdomen is soft.      Tenderness: There is abdominal tenderness. There is guarding.   Musculoskeletal:         General: Normal range of motion.      Cervical back: Normal range of motion and neck supple.   Skin:     General: Skin is warm and dry.   Neurological:      General: No focal deficit present.      Mental Status: He is alert and oriented to person, place, and time.   Psychiatric:         Mood and Affect: Mood normal.         Behavior: Behavior normal.         Thought Content: Thought content normal.         Judgment: Judgment normal.         Past Medical History:   No past medical history on file.    Past Surgical History:  No past surgical  history on file.    Hospital Medications:    Current Facility-Administered Medications:   •  D5LR infusion, , Intravenous, Continuous, Maximo Cho M.D.  •  [Held by provider] heparin injection 5,000 Units, 5,000 Units, Subcutaneous, Q8HRS, Marcel Gonzalez M.D., 5,000 Units at 11/29/21 0622  •  acetaminophen (Tylenol) tablet 650 mg, 650 mg, Oral, Q6HRS PRN, Marcel Gonzalez M.D.  •  HYDROmorphone (Dilaudid) injection 1 mg, 1 mg, Intravenous, Q4HRS PRN, Marcel Gonzalez M.D., 1 mg at 11/29/21 1258  •  oxyCODONE-acetaminophen (PERCOCET) 5-325 MG per tablet 1 Tablet, 1 Tablet, Oral, Q4HRS PRN, Marcel Gonzalez M.D., 1 Tablet at 11/28/21 2215  •  POC Blood Glucose, , , Q6H **AND** insulin regular (HumuLIN R,NovoLIN R) injection, 3-15 Units, Subcutaneous, 4X/DAY ACHS, Marcel Gonzalez M.D., 3 Units at 11/28/21 2117  •  [DISCONTINUED] piperacillin-tazobactam (ZOSYN) 4.5 g in  mL IVPB, 4.5 g, Intravenous, Once **AND** piperacillin-tazobactam (ZOSYN) 4.5 g in  mL IVPB, 4.5 g, Intravenous, Q8HRS, Marcel Gonzalez M.D., Last Rate: 25 mL/hr at 11/29/21 1340, 4.5 g at 11/29/21 1340    Current Outpatient Medications:  Current Facility-Administered Medications   Medication Dose Route Frequency Provider Last Rate Last Admin   • D5LR infusion   Intravenous Continuous Maximo Cho M.D.       • [Held by provider] heparin injection 5,000 Units  5,000 Units Subcutaneous Q8HRS Marcel Gonzalez M.D.   5,000 Units at 11/29/21 0622   • acetaminophen (Tylenol) tablet 650 mg  650 mg Oral Q6HRS PRN Marcel Gonzalez M.D.       • HYDROmorphone (Dilaudid) injection 1 mg  1 mg Intravenous Q4HRS PRCIRILO Gonzalez M.D.   1 mg at 11/29/21 1258   • oxyCODONE-acetaminophen (PERCOCET) 5-325 MG per tablet 1 Tablet  1 Tablet Oral Q4HRS PRCIRILO Gonzalez M.D.   1 Tablet at 11/28/21 2215   • insulin regular (HumuLIN R,NovoLIN R) injection  3-15 Units Subcutaneous 4X/DAY OLEG Gonzalez M.D.   3 Units at  11/28/21 2117   • piperacillin-tazobactam (ZOSYN) 4.5 g in  mL IVPB  4.5 g Intravenous Q8HRS Marcel Gonzalez M.D. 25 mL/hr at 11/29/21 1340 4.5 g at 11/29/21 1340       Medication Allergy:  No Known Allergies    Family History:  No family history on file.    Social History:  Social History     Socioeconomic History   • Marital status:      Spouse name: Not on file   • Number of children: Not on file   • Years of education: Not on file   • Highest education level: Not on file   Occupational History   • Not on file   Tobacco Use   • Smoking status: Current Every Day Smoker     Packs/day: 0.25     Types: Cigarettes   • Smokeless tobacco: Not on file   Substance and Sexual Activity   • Alcohol use: Never   • Drug use: Never   • Sexual activity: Not on file   Other Topics Concern   • Not on file   Social History Narrative   • Not on file     Social Determinants of Health     Financial Resource Strain:    • Difficulty of Paying Living Expenses: Not on file   Food Insecurity:    • Worried About Running Out of Food in the Last Year: Not on file   • Ran Out of Food in the Last Year: Not on file   Transportation Needs:    • Lack of Transportation (Medical): Not on file   • Lack of Transportation (Non-Medical): Not on file   Physical Activity:    • Days of Exercise per Week: Not on file   • Minutes of Exercise per Session: Not on file   Stress:    • Feeling of Stress : Not on file   Social Connections:    • Frequency of Communication with Friends and Family: Not on file   • Frequency of Social Gatherings with Friends and Family: Not on file   • Attends Judaism Services: Not on file   • Active Member of Clubs or Organizations: Not on file   • Attends Club or Organization Meetings: Not on file   • Marital Status: Not on file   Intimate Partner Violence:    • Fear of Current or Ex-Partner: Not on file   • Emotionally Abused: Not on file   • Physically Abused: Not on file   • Sexually Abused: Not on file    Housing Stability:    • Unable to Pay for Housing in the Last Year: Not on file   • Number of Places Lived in the Last Year: Not on file   • Unstable Housing in the Last Year: Not on file         MDM (Data Review):     Records reviewed and summarized in current documentation    Lab Data Review:  Recent Results (from the past 24 hour(s))   CBC with Differential    Collection Time: 11/28/21  4:13 PM   Result Value Ref Range    WBC 12.7 (H) 4.8 - 10.8 K/uL    RBC 6.03 4.70 - 6.10 M/uL    Hemoglobin 18.4 (H) 14.0 - 18.0 g/dL    Hematocrit 52.4 (H) 42.0 - 52.0 %    MCV 86.9 81.4 - 97.8 fL    MCH 30.5 27.0 - 33.0 pg    MCHC 35.1 33.7 - 35.3 g/dL    RDW 39.5 35.9 - 50.0 fL    Platelet Count 473 (H) 164 - 446 K/uL    MPV 9.0 9.0 - 12.9 fL    Neutrophils-Polys 83.20 (H) 44.00 - 72.00 %    Lymphocytes 10.80 (L) 22.00 - 41.00 %    Monocytes 5.40 0.00 - 13.40 %    Eosinophils 0.00 0.00 - 6.90 %    Basophils 0.20 0.00 - 1.80 %    Immature Granulocytes 0.40 0.00 - 0.90 %    Nucleated RBC 0.00 /100 WBC    Neutrophils (Absolute) 10.53 (H) 1.82 - 7.42 K/uL    Lymphs (Absolute) 1.37 1.00 - 4.80 K/uL    Monos (Absolute) 0.68 0.00 - 0.85 K/uL    Eos (Absolute) 0.00 0.00 - 0.51 K/uL    Baso (Absolute) 0.02 0.00 - 0.12 K/uL    Immature Granulocytes (abs) 0.05 0.00 - 0.11 K/uL    NRBC (Absolute) 0.00 K/uL   Complete Metabolic Panel (CMP)    Collection Time: 11/28/21  4:13 PM   Result Value Ref Range    Sodium 130 (L) 135 - 145 mmol/L    Potassium 4.7 3.6 - 5.5 mmol/L    Chloride 96 96 - 112 mmol/L    Co2 22 20 - 33 mmol/L    Anion Gap 12.0 7.0 - 16.0    Glucose 196 (H) 65 - 99 mg/dL    Bun 12 8 - 22 mg/dL    Creatinine 0.72 0.50 - 1.40 mg/dL    Calcium 10.0 8.5 - 10.5 mg/dL    AST(SGOT) 476 (H) 12 - 45 U/L    ALT(SGPT) 329 (H) 2 - 50 U/L    Alkaline Phosphatase 307 (H) 30 - 99 U/L    Total Bilirubin 5.5 (H) 0.1 - 1.5 mg/dL    Albumin 4.5 3.2 - 4.9 g/dL    Total Protein 8.5 (H) 6.0 - 8.2 g/dL    Globulin 4.0 (H) 1.9 - 3.5 g/dL    A-G  Ratio 1.1 g/dL   Troponin    Collection Time: 21  4:13 PM   Result Value Ref Range    Troponin T <6 6 - 19 ng/L   LIPASE    Collection Time: 21  4:13 PM   Result Value Ref Range    Lipase 17 11 - 82 U/L   ESTIMATED GFR    Collection Time: 21  4:13 PM   Result Value Ref Range    GFR If African American >60 >60 mL/min/1.73 m 2    GFR If Non African American >60 >60 mL/min/1.73 m 2   HEPATIC FUNCTION PANEL    Collection Time: 21  4:13 PM   Result Value Ref Range    Direct Bilirubin 4.0 (H) 0.1 - 0.5 mg/dL    Indirect Bilirubin 1.5 (H) 0.0 - 1.0 mg/dL   EKG    Collection Time: 21  4:19 PM   Result Value Ref Range    Report       [facility]    Test Date:  2021  Pt Name:    LUCY CASTRO                Department: ER  MRN:        [id number]                      Room:       [facility]  Gender:     Male                         Technician: [id number]  :        1970                   Requested By:ER TRIAGE PROTOCOL  Order #:    [id number]                    Reading MD:    Measurements  Intervals                                Axis  Rate:       62                           P:          0  WY:         111                          QRS:        29  QRSD:       80                           T:          57  QT:         412  QTc:        419    Interpretive Statements  SINUS RHYTHM  BORDERLINE SHORT WY INTERVAL  No previous ECG available for comparison     HEMOGLOBIN A1C    Collection Time: 21  5:23 PM   Result Value Ref Range    Glycohemoglobin 5.8 (H) 4.0 - 5.6 %    Est Avg Glucose 120 mg/dL   Blood Culture,Hold    Collection Time: 21  6:03 PM   Result Value Ref Range    Blood Culture Hold Collected    POCT glucose device results    Collection Time: 21  9:07 PM   Result Value Ref Range    Glucose - Accu-Ck 161 (H) 65 - 99 mg/dL   POCT glucose device results    Collection Time: 21  4:57 AM   Result Value Ref Range    Glucose - Accu-Ck 115 (H) 65  - 99 mg/dL   Comp Metabolic Panel    Collection Time: 11/29/21  9:36 AM   Result Value Ref Range    Sodium 136 135 - 145 mmol/L    Potassium 4.4 3.6 - 5.5 mmol/L    Chloride 99 96 - 112 mmol/L    Co2 27 20 - 33 mmol/L    Anion Gap 10.0 7.0 - 16.0    Glucose 137 (H) 65 - 99 mg/dL    Bun 10 8 - 22 mg/dL    Creatinine 0.66 0.50 - 1.40 mg/dL    Calcium 9.1 8.5 - 10.5 mg/dL    AST(SGOT) 265 (H) 12 - 45 U/L    ALT(SGPT) 363 (H) 2 - 50 U/L    Alkaline Phosphatase 319 (H) 30 - 99 U/L    Total Bilirubin 7.5 (H) 0.1 - 1.5 mg/dL    Albumin 3.7 3.2 - 4.9 g/dL    Total Protein 6.8 6.0 - 8.2 g/dL    Globulin 3.1 1.9 - 3.5 g/dL    A-G Ratio 1.2 g/dL   ESTIMATED GFR    Collection Time: 11/29/21  9:36 AM   Result Value Ref Range    GFR If African American >60 >60 mL/min/1.73 m 2    GFR If Non African American >60 >60 mL/min/1.73 m 2   CBC WITH DIFFERENTIAL    Collection Time: 11/29/21 11:42 AM   Result Value Ref Range    WBC 12.3 (H) 4.8 - 10.8 K/uL    RBC 5.44 4.70 - 6.10 M/uL    Hemoglobin 16.5 14.0 - 18.0 g/dL    Hematocrit 48.4 42.0 - 52.0 %    MCV 89.0 81.4 - 97.8 fL    MCH 30.3 27.0 - 33.0 pg    MCHC 34.1 33.7 - 35.3 g/dL    RDW 41.6 35.9 - 50.0 fL    Platelet Count 396 164 - 446 K/uL    MPV 9.0 9.0 - 12.9 fL    Neutrophils-Polys 81.60 (H) 44.00 - 72.00 %    Lymphocytes 9.00 (L) 22.00 - 41.00 %    Monocytes 8.70 0.00 - 13.40 %    Eosinophils 0.00 0.00 - 6.90 %    Basophils 0.30 0.00 - 1.80 %    Immature Granulocytes 0.40 0.00 - 0.90 %    Nucleated RBC 0.00 /100 WBC    Neutrophils (Absolute) 10.04 (H) 1.82 - 7.42 K/uL    Lymphs (Absolute) 1.11 1.00 - 4.80 K/uL    Monos (Absolute) 1.07 (H) 0.00 - 0.85 K/uL    Eos (Absolute) 0.00 0.00 - 0.51 K/uL    Baso (Absolute) 0.04 0.00 - 0.12 K/uL    Immature Granulocytes (abs) 0.05 0.00 - 0.11 K/uL    NRBC (Absolute) 0.00 K/uL   PLATELET MAPPING WITH BASIC TEG    Collection Time: 11/29/21 11:42 AM   Result Value Ref Range    Reaction Time Initial-R 5.3 4.6 - 9.1 min    React Time Initial  Hep 6.4 4.3 - 8.3 min    Clot Kinetics-K 1.0 0.8 - 2.1 min    Clot Angle-Angle 76.3 63.0 - 78.0 degrees    Maximum Clot Strength-MA 68.3 52.0 - 69.0 mm    TEG Functional Fibrinogen(MA) 31.5 15.0 - 32.0 mm    Lysis 30 minutes-LY30 0.0 0.0 - 2.6 %    % Inhibition ADP 19.9 (H) 0.0 - 17.0 %    % Inhibition AA 6.0 0.0 - 11.0 %    TEG Algorithm Link Algorithm        Imaging/Procedures Review:      [id number]   Final Result      1.  Colonic wall thickening with stones and probable sludge consistent with acute cholecystitis.   2.  No discrete signal void within the common bile duct to indicate stone, however heterogeneous decreased T2 signal may indicate sludge or debris.   3.  No significant biliary dilation.      US-RUQ   Final Result      1.  Gallbladder sludge and stones with mild wall thickening and positive sonographic Bazzi's sign is suspicious for acute cholecystitis.   2.  Hepatic steatosis.      DX-CHEST-PORTABLE (1 VIEW)   Final Result      No evidence of acute cardiopulmonary process.           MDM (Assessment and Plan):     Patient Active Problem List    Diagnosis Date Noted   • Choledocholithiasis 11/29/2021   • Hepatic steatosis 11/29/2021   • Cholecystitis 11/28/2021   • Morbid obesity (HCC) 11/28/2021   • Hyperglycemia 11/28/2021   • Transaminitis 11/28/2021     This is a pleasant 51-year-old male, who was admitted to the hospital 11/28/2021 with a 1 month history of severe intermittent epigastric/right upper quadrant pain, nausea, chills.  He was evaluated at the urgent care x2-prescribed pain medication which was not helpful.  In the emergency room, he was hypertensive, WBC 12.7, total bilirubin 4.5.  .  .  Alkaline phosphatase 307.  Abdominal ultrasound-gallbladder sludge and stones with mild wall thickening, positive Bazzi sign suspicious for acute cholecystitis.  Hepatic steatosis.  Common bile duct measures 5 mm.  General surgery was consulted who recommended an MRCP.  Gallbladder  wall thickening with stones and probable sludge consistent with acute cholecystitis.  No discrete signal void within the common bile duct to indicate stone however heterogenous decreased T2 signal may indicate sludge or debris.  No significant biliary dilatation.  His total bilirubin continues to rise.  Current bilirubin 7.5.  He is maintained on Zosyn IV.    ASSESSMENT:  1. Abdominal pain: likely due to cholelithiasis/cholecystitis with possible choledocholithiasis.  2.  Elevated LFTs-total bilirubin trending up-concern for choledocholithiasis  3.  Cholecystitis  4.  Hypertension  5.  Wheezing-chest x-ray unremarkable for infiltrates or effusions.    PLAN:  1.  Clear liquid diet and n.p.o. after midnight  2.  Risk versus benefits of ERCP was discussed with patient.  Risk include heart and lung event secondary to anesthesia.  Other risk include bleeding, infection, nicking of the bile ducts, pancreatitis.  Questions were answered. 11/30/21   3.  Continue Zosyn IV, pain medication per primary team  4.  Patient will need cholecystectomy at optimal timing per surgery  5.  Need rapid Covid      Thank your for the opportunity to assist in the care of your patient.  Please call for any questions or concerns.    GAVINO Durbin.

## 2021-11-29 NOTE — HOSPITAL COURSE
This is 51-year-old male with a past medical history significant for obesity presented to the ER on 11/20/2021 with a complaint of abdominal pain that started 4 weeks ago; continue to get worse progressively.    He has been to urgent care x2, primary care physician's office.  Patient stated that decreased p.o. intake abdominal normal/epigastric pain 10 out of 10 in intensity.  Upon presentation in ER, he was noted to be hypertensive, WBC 12.7 hemoglobin 18.4, platelet 373 sodium 130, AST//29 1507, T-Bili  4.5;   AST/ALT/ALP/T-BILI  265/363/319/7.5    Blood Cx  patient is a started on broad-spectrum antibiotics including Zosyn. US of the abdomen showing Gallbladder sludge and stones with mild wall thickening and positive sonographic Bazzi's sign is suspicious for acute cholecystitis; surgery consulted.    MRI of abdomen showing No discrete signal void within the common bile duct to indicate stone, however heterogeneous decreased T2 signal may indicate sludge or debris. Gi Dr Carlos called who will be coming and evaluating the patient .

## 2021-11-29 NOTE — H&P
Hospital Medicine History & Physical Note    Date of Service  11/28/2021    Primary Care Physician  No primary care provider on file.    Consultants  general surgery    Specialist Names: Dr. Clemons    Code Status  Full Code    Chief Complaint  Chief Complaint   Patient presents with   • Epigastric Pain   • Other     [facility] and was told to come to the ER for possible gallbladder issue       History of Presenting Illness  Alec Murrell is a 51 y.o. male who presented 11/28/2021 with right upper quadrant abdominal pain started about 4 weeks ago.  It has been progressively getting worse.  Patient has tried to [facility] to [facility] for pain medications which does not give him relief.  For the last few days, he is reported worsening pain, associated chills, nausea, nonbloody nonbilious vomitus.  Patient denies any medical history and does not take any medications.  He smokes about 5 cigarettes a day for the last 3 years.  Denies alcohol and illicit drug use.    In the ED, patient found to have elevated blood pressure, other vital signs normal.  Remarkable labs include neutrophilic leukocytosis, erythrocytosis, sodium 130, sugar 196, , , alkaline phosphatase 207, total bilirubin 5.5.  Chest x-ray negative for acute pathology.  Right upper quadrant ultrasound shows gallbladder sludge and stones with mild wall thickening and positive sonographic Bazzi sign suspicious for acute cholecystitis.  General surgery contacted, recommendations to be followed.  Patient was given pain medication, fluids, Zosyn in the ED.    I discussed the plan of care with patient.    Review of Systems  Review of Systems   Constitutional: Positive for chills. Negative for diaphoresis, malaise/fatigue and weight loss.   HENT: Negative.    Eyes: Negative.    Respiratory: Negative.    Cardiovascular: Negative.    Gastrointestinal: Positive for abdominal pain, nausea and vomiting.   Genitourinary: Negative.    Musculoskeletal:  Negative.    Skin: Negative.    Neurological: Negative.    Endo/Heme/Allergies: Negative.    Psychiatric/Behavioral: Negative.        Past Medical History  No pertinent medical history    Surgical History  No pertinent surgical history    Family History   Family history reviewed with patient. There is no family history that is pertinent to the chief complaint.     Social History   reports that he has been smoking cigarettes. He has been smoking about 0.25 packs per day. He does not have any smokeless tobacco history on file. He reports that he does not drink alcohol and does not use drugs.    Allergies  No Known Allergies    Medications  Prior to Admission Medications   Prescriptions Last Dose Informant Patient Reported? Taking?   SIMETHICONE-80 PO 11/28/2021 at AM Patient Yes Yes   Sig: Take 1 Capsule by mouth as needed (gas).   calcium carbonate (TUMS) 500 MG Chew Tab 11/27/2021 at PM Patient Yes Yes   Sig: Chew 500 mg 1 time a day as needed (acid reflux).      Facility-Administered Medications: None       Physical Exam  Temp:  [36.1 °C (97 °F)] 36.1 °C (97 °F)  Pulse:  [68-76] 76  Resp:  [18] 18  BP: (151-185)/(89-92) 162/89  SpO2:  [92 %-97 %] 92 %  Blood Pressure: (!) 162/89   Temperature: 36.1 °C (97 °F)   Pulse: 76   Respiration: 18   Pulse Oximetry: 92 %       Physical Exam  Constitutional:       Appearance: Normal appearance. He is obese.   HENT:      Head: Normocephalic.      Nose: Nose normal.      Mouth/Throat:      Mouth: Mucous membranes are moist.   Eyes:      General: Scleral icterus present.      Pupils: Pupils are equal, round, and reactive to light.   Cardiovascular:      Rate and Rhythm: Normal rate and regular rhythm.      Pulses: Normal pulses.   Pulmonary:      Effort: Pulmonary effort is normal.      Breath sounds: Normal breath sounds.   Abdominal:      General: Abdomen is flat.      Comments: Bazzi sign positive  Soft abdomen   Musculoskeletal:         General: Normal range of motion.       Cervical back: Normal range of motion.   Skin:     General: Skin is warm.      Coloration: Skin is jaundiced.   Neurological:      General: No focal deficit present.      Mental Status: He is alert and oriented to person, place, and time. Mental status is at baseline.   Psychiatric:         Mood and Affect: Mood normal.         Behavior: Behavior normal.         Thought Content: Thought content normal.         Judgment: Judgment normal.         Laboratory:  Recent Labs     11/28/21  1613   WBC 12.7*   RBC 6.03   HEMOGLOBIN 18.4*   HEMATOCRIT 52.4*   MCV 86.9   MCH 30.5   MCHC 35.1   RDW 39.5   PLATELETCT 473*   MPV 9.0     Recent Labs     11/28/21  1613   SODIUM 130*   POTASSIUM 4.7   CHLORIDE 96   CO2 22   GLUCOSE 196*   BUN 12   CREATININE 0.72   CALCIUM 10.0     Recent Labs     11/28/21  1613   ALTSGPT 329*   ASTSGOT 476*   ALKPHOSPHAT 307*   TBILIRUBIN 5.5*   LIPASE 17   GLUCOSE 196*         No results for input(s): NTPROBNP in the last 72 hours.      Recent Labs     11/28/21  1613   TROPONINT <6       Imaging:  US-RUQ   Final Result      1.  Gallbladder sludge and stones with mild wall thickening and positive sonographic Bazzi's sign is suspicious for acute cholecystitis.   2.  Hepatic steatosis.      DX-CHEST-PORTABLE (1 VIEW)   Final Result      No evidence of acute cardiopulmonary process.      [id number]    (Results Pending)       no X-Ray or EKG requiring interpretation    Assessment/Plan:  I anticipate this patient will require at least two midnights for appropriate medical management, necessitating inpatient admission.    * Cholecystitis without cholelithiasis- (present on admission)  Assessment & Plan  Admit patient to surgical floor  Zosyn   Fluids  Pain medication  Surgery consulted, follow official note  NPO   MRCP to r/o choledocholithiasis      Transaminitis  Assessment & Plan  Likely from suspected calculus on bile duct  Serial LFTs    Hyperglycemia  Assessment & Plan  No hx of known  diabetes  RISS  A1c     Morbid obesity (HCC)  Assessment & Plan  15 minutes spent counseling patient on weight loss, nutrition, and healthy lifestyle        VTE prophylaxis: heparin ppx

## 2021-11-29 NOTE — PROGRESS NOTES
AA&Ox4.   SpO2 >90% on 2L via NC. Denies SOB.  Reporting 10/10 pain. Medicated per MAR.   Strict NPO. Denies N/V.  + void.   LBM PTA.   Pt up with SBA.  All needs met at this time. Call light within reach.

## 2021-11-29 NOTE — PROGRESS NOTES
Hospital Medicine Daily Progress Note    Date of Service  11/29/2021    Chief Complaint  Alec Murrell is a 51 y.o. male admitted 11/28/2021 with   Chief Complaint   Patient presents with   • Epigastric Pain   • Other     [facility] and was told to come to the ER for possible gallbladder issue         Hospital Course  This is 51-year-old male with a past medical history significant for obesity presented to the ER on 11/20/2021 with a complaint of abdominal pain that started 4 weeks ago; continue to get worse progressively.    He has been to urgent care x2, primary care physician's office.  Patient stated that decreased p.o. intake abdominal normal/epigastric pain 10 out of 10 in intensity.  Upon presentation in ER, he was noted to be hypertensive, WBC 12.7 hemoglobin 18.4, platelet 373 sodium 130, AST//29 1507, T-Bili  4.5;   AST/ALT/ALP/T-BILI  265/363/319/7.5    Blood Cx  patient is a started on broad-spectrum antibiotics including Zosyn. US of the abdomen showing Gallbladder sludge and stones with mild wall thickening and positive sonographic Bazzi's sign is suspicious for acute cholecystitis; surgery consulted.    MRI of abdomen showing No discrete signal void within the common bile duct to indicate stone, however heterogeneous decreased T2 signal may indicate sludge or debris. Gi Dr Carlos called who will be coming and evaluating the patient .    Interval Problem Update  No acute events overnight, laying in the bed, continues to complain of abdominal pain.  MRI of the abdomen reviewed,  GI Dr Carlos will be evaluating the patient   --- Continue IV antibiotics     I have personally seen and examined the patient at bedside. I discussed the plan of care with patient, family, bedside RN, charge RN, , pharmacy and general surgery and GI.    Consultants/Specialty  general surgery and GI    Code Status  Full Code    Disposition  Patient is not medically cleared.   Anticipate  discharge to to home with close outpatient follow-up.  I have placed the appropriate orders for post-discharge needs.    Review of Systems  Review of Systems   Constitutional: Positive for malaise/fatigue. Negative for chills and fever.   HENT: Negative for congestion and sore throat.    Eyes: Negative for blurred vision and double vision.   Respiratory: Positive for shortness of breath. Negative for cough, hemoptysis and sputum production.    Cardiovascular: Negative for chest pain, palpitations, orthopnea and claudication.   Gastrointestinal: Positive for abdominal pain and nausea. Negative for heartburn and vomiting.   Genitourinary: Negative for dysuria.   Neurological: Negative for focal weakness and headaches.   Psychiatric/Behavioral: Negative for depression. The patient is nervous/anxious.         Physical Exam  Temp:  [36.8 °C (98.3 °F)-36.9 °C (98.4 °F)] 36.9 °C (98.4 °F)  Pulse:  [68-95] 93  Resp:  [18-39] 18  BP: (143-185)/(74-98) 143/89  SpO2:  [88 %-97 %] 94 %    Physical Exam  Vitals and nursing note reviewed.   Constitutional:       Appearance: He is obese.   HENT:      Head: Normocephalic and atraumatic.   Eyes:      Extraocular Movements: Extraocular movements intact.      Pupils: Pupils are equal, round, and reactive to light.   Cardiovascular:      Rate and Rhythm: Normal rate.      Pulses: Normal pulses.      Heart sounds: No murmur heard.      Pulmonary:      Effort: Pulmonary effort is normal. No respiratory distress.      Breath sounds: Normal breath sounds.   Abdominal:      Palpations: Abdomen is soft.      Tenderness: There is abdominal tenderness. There is guarding.   Musculoskeletal:      Cervical back: Neck supple.      Right lower leg: No edema.      Left lower leg: No edema.   Skin:     General: Skin is warm.   Neurological:      Mental Status: He is oriented to person, place, and time.      Cranial Nerves: No cranial nerve deficit.   Psychiatric:         Mood and Affect: Mood normal.          Fluids    Intake/Output Summary (Last 24 hours) at 11/29/2021 7339  Last data filed at 11/29/2021 1258  Gross per 24 hour   Intake --   Output 601 ml   Net -601 ml       Laboratory  Recent Labs     11/28/21  1613 11/29/21  1142   WBC 12.7* 12.3*   RBC 6.03 5.44   HEMOGLOBIN 18.4* 16.5   HEMATOCRIT 52.4* 48.4   MCV 86.9 89.0   MCH 30.5 30.3   MCHC 35.1 34.1   RDW 39.5 41.6   PLATELETCT 473* 396   MPV 9.0 9.0     Recent Labs     11/28/21  1613 11/29/21  0936   SODIUM 130* 136   POTASSIUM 4.7 4.4   CHLORIDE 96 99   CO2 22 27   GLUCOSE 196* 137*   BUN 12 10   CREATININE 0.72 0.66   CALCIUM 10.0 9.1                   Imaging  [id number]   Final Result      1.  Colonic wall thickening with stones and probable sludge consistent with acute cholecystitis.   2.  No discrete signal void within the common bile duct to indicate stone, however heterogeneous decreased T2 signal may indicate sludge or debris.   3.  No significant biliary dilation.      US-RUQ   Final Result      1.  Gallbladder sludge and stones with mild wall thickening and positive sonographic Bazzi's sign is suspicious for acute cholecystitis.   2.  Hepatic steatosis.      DX-CHEST-PORTABLE (1 VIEW)   Final Result      No evidence of acute cardiopulmonary process.           Assessment/Plan  * Cholecystitis- (present on admission)  Assessment & Plan  Admit patient to surgical floor  Blood Cx X 2  Pending, General surgery following    -- continue iV abx     Choledocholithiasis- (present on admission)  Assessment & Plan  No discrete signal void within the common bile duct to indicate stone, however heterogeneous decreased T2 signal may indicate sludge or debris.   -- Gi Dr Carlos called who will be coming and evaluating the patient .   -- continue Zosyn   F/u Blood cx      Hepatic steatosis  Assessment & Plan  Life style modification including diet/ exercise and weight loss     Transaminitis  Assessment & Plan  Likely 2/2  cholecystitis   --AST/ALT/ALP/T-BILI  265/363/319/7.5   -- GI/general surgery called    Hyperglycemia  Assessment & Plan    A1c at 6  Diabetic diet    Morbid obesity (HCC)  Assessment & Plan  15 minutes spent counseling patient on weight loss, nutrition, and healthy lifestyle         VTE prophylaxis: SCDs/TEDs

## 2021-11-29 NOTE — ED NOTES
Med Rec completed: per patient at bedside.     Patient reports no ORAL antibiotics in last 30 days    Preferred Pharmacy: [facility] [phone]      Pt confirmed following allergies:  No Known Allergies     Pt's home medications:   Medication Sig Notes   • SIMETHICONE-80 PO Take 1 Capsule by mouth as needed (gas).          • calcium carbonate (TUMS) 500 MG Chew Tab Chew 500 mg 1 time a day as needed (acid reflux).

## 2021-11-29 NOTE — PROGRESS NOTES
"  DATE: 11/29/2021    [facility] BLUE SERVICE PROGRESS NOTE    Hospital Day 2 RUQ pain / elevated T bili - possible choledocholithiasis.    Interval Events:  Continued right upper quadrant pain  GI consult today for possible ERCP as rising total bilirubin  MRCP with abnormality within common bile duct - possible sludge.     PHYSICAL EXAMINATION:  Constitutional:     Vital Signs: /79   Pulse 80   Temp 36.8 °C (98.3 °F) (Oral)   Resp (!) 22   Ht 1.651 m (5' 5\")   Wt 89.9 kg (198 lb 3.1 oz)   SpO2 91%    General Appearance: The patient is a pleasant  and cooperative man in no critical distress.  HEENT:    The pupils are equal, round, and reactive to light bilaterally. The extraocular muscles are intact bilaterally. The nares and oropharynx are clear. Normal range of motion. No jugulovenous distension.  Respiratory:   Inspection: Unlabored respirations, no intercostal retractions, paradoxical motion, or accessory muscle use.   Palpation:  The chest is nontender.    Auscultation: normal.  Cardiovascular:   Inspection: The skin is warm and dry.  Auscultation: Regular rate and rhythm.   Peripheral Pulses: Normal.   Abdomen:   Inspection: Abdominal inspection reveals no abrasions, contusions, lacerations or penetrating wounds.   Palpation: Palpation is remarkable for mild tenderness in the right subcostal region.    Laboratory Values:   Recent Labs     11/28/21  1613   WBC 12.7*   RBC 6.03   HEMOGLOBIN 18.4*   HEMATOCRIT 52.4*   MCV 86.9   MCH 30.5   MCHC 35.1   RDW 39.5   PLATELETCT 473*   MPV 9.0     Recent Labs     11/28/21  1613 11/29/21  0936   SODIUM 130* 136   POTASSIUM 4.7 4.4   CHLORIDE 96 99   CO2 22 27   GLUCOSE 196* 137*   BUN 12 10   CREATININE 0.72 0.66   CALCIUM 10.0 9.1     Recent Labs     11/28/21  1613 11/29/21  0936   ASTSGOT 476* 265*   ALTSGPT 329* 363*   TBILIRUBIN 5.5* 7.5*   IBILIRUBIN 1.5*  --    DBILIRUBIN 4.0*  --    ALKPHOSPHAT 307* 319*   GLOBULIN 4.0* 3.1            Imaging: "   [id number]   Final Result      1.  Colonic wall thickening with stones and probable sludge consistent with acute cholecystitis.   2.  No discrete signal void within the common bile duct to indicate stone, however heterogeneous decreased T2 signal may indicate sludge or debris.   3.  No significant biliary dilation.      US-RUQ   Final Result      1.  Gallbladder sludge and stones with mild wall thickening and positive sonographic Bazzi's sign is suspicious for acute cholecystitis.   2.  Hepatic steatosis.      DX-CHEST-PORTABLE (1 VIEW)   Final Result      No evidence of acute cardiopulmonary process.          ASSESSMENT AND PLAN:     * Cholecystitis- (present on admission)  Assessment & Plan  US shows Gallbladder sludge and stones with mild wall thickening and positive sonographic Bazzi's sign is suspicious for acute cholecystitis  LFTs elevated   MRCP pending  IV abx    Choledocholithiasis- (present on admission)  Assessment & Plan  Admission T bili 5.5  11/29 T bili 7.5  MRCP:  No discrete signal void within the common bile duct to indicate stone, however heterogeneous decreased T2 signal may indicate sludge or debris. No significant biliary dilation.  GI consult        DISPOSITION: GI consult  NPO for ERCP  Check INR / platelet mapping       ____________________________________     Javy Azevedo M.D.    DD: 11/29/2021  11:08 AM

## 2021-11-30 ENCOUNTER — APPOINTMENT (OUTPATIENT)
Dept: RADIOLOGY | Facility: MEDICAL CENTER | Age: 51
DRG: 417 | End: 2021-11-30
Attending: INTERNAL MEDICINE
Payer: COMMERCIAL

## 2021-11-30 ENCOUNTER — ANESTHESIA EVENT (OUTPATIENT)
Dept: SURGERY | Facility: MEDICAL CENTER | Age: 51
DRG: 417 | End: 2021-11-30
Payer: COMMERCIAL

## 2021-11-30 ENCOUNTER — ANESTHESIA (OUTPATIENT)
Dept: SURGERY | Facility: MEDICAL CENTER | Age: 51
DRG: 417 | End: 2021-11-30
Payer: COMMERCIAL

## 2021-11-30 PROBLEM — J45.21 MILD INTERMITTENT ASTHMA WITH EXACERBATION: Status: ACTIVE | Noted: 2021-11-30

## 2021-11-30 PROBLEM — J96.01 ACUTE RESPIRATORY FAILURE WITH HYPOXIA (HCC): Status: ACTIVE | Noted: 2021-11-30

## 2021-11-30 LAB
ALBUMIN SERPL BCP-MCNC: 3.4 G/DL (ref 3.2–4.9)
ALBUMIN/GLOB SERPL: 1 G/DL
ALP SERPL-CCNC: 301 U/L (ref 30–99)
ALT SERPL-CCNC: 240 U/L (ref 2–50)
ANION GAP SERPL CALC-SCNC: 10 MMOL/L (ref 7–16)
AST SERPL-CCNC: 104 U/L (ref 12–45)
BASOPHILS # BLD AUTO: 0.4 % (ref 0–1.8)
BASOPHILS # BLD: 0.03 K/UL (ref 0–0.12)
BILIRUB SERPL-MCNC: 7.6 MG/DL (ref 0.1–1.5)
BUN SERPL-MCNC: 10 MG/DL (ref 8–22)
CALCIUM SERPL-MCNC: 9.2 MG/DL (ref 8.5–10.5)
CHLORIDE SERPL-SCNC: 100 MMOL/L (ref 96–112)
CO2 SERPL-SCNC: 25 MMOL/L (ref 20–33)
CREAT SERPL-MCNC: 0.64 MG/DL (ref 0.5–1.4)
EOSINOPHIL # BLD AUTO: 0.01 K/UL (ref 0–0.51)
EOSINOPHIL NFR BLD: 0.1 % (ref 0–6.9)
ERYTHROCYTE [DISTWIDTH] IN BLOOD BY AUTOMATED COUNT: 42.4 FL (ref 35.9–50)
GLOBULIN SER CALC-MCNC: 3.3 G/DL (ref 1.9–3.5)
GLUCOSE BLD-MCNC: 130 MG/DL (ref 65–99)
GLUCOSE SERPL-MCNC: 128 MG/DL (ref 65–99)
HCT VFR BLD AUTO: 50.2 % (ref 42–52)
HGB BLD-MCNC: 16.7 G/DL (ref 14–18)
IMM GRANULOCYTES # BLD AUTO: 0.02 K/UL (ref 0–0.11)
IMM GRANULOCYTES NFR BLD AUTO: 0.3 % (ref 0–0.9)
LYMPHOCYTES # BLD AUTO: 1.86 K/UL (ref 1–4.8)
LYMPHOCYTES NFR BLD: 24 % (ref 22–41)
MCH RBC QN AUTO: 30 PG (ref 27–33)
MCHC RBC AUTO-ENTMCNC: 33.3 G/DL (ref 33.7–35.3)
MCV RBC AUTO: 90.1 FL (ref 81.4–97.8)
MONOCYTES # BLD AUTO: 0.8 K/UL (ref 0–0.85)
MONOCYTES NFR BLD AUTO: 10.3 % (ref 0–13.4)
NEUTROPHILS # BLD AUTO: 5.04 K/UL (ref 1.82–7.42)
NEUTROPHILS NFR BLD: 64.9 % (ref 44–72)
NRBC # BLD AUTO: 0 K/UL
NRBC BLD-RTO: 0 /100 WBC
PLATELET # BLD AUTO: 321 K/UL (ref 164–446)
PMV BLD AUTO: 9.5 FL (ref 9–12.9)
POTASSIUM SERPL-SCNC: 4 MMOL/L (ref 3.6–5.5)
PROT SERPL-MCNC: 6.7 G/DL (ref 6–8.2)
RBC # BLD AUTO: 5.57 M/UL (ref 4.7–6.1)
SODIUM SERPL-SCNC: 135 MMOL/L (ref 135–145)
WBC # BLD AUTO: 7.8 K/UL (ref 4.8–10.8)

## 2021-11-30 PROCEDURE — 160002 HCHG RECOVERY MINUTES (STAT): Performed by: INTERNAL MEDICINE

## 2021-11-30 PROCEDURE — 160048 HCHG OR STATISTICAL LEVEL 1-5: Performed by: INTERNAL MEDICINE

## 2021-11-30 PROCEDURE — 700111 HCHG RX REV CODE 636 W/ 250 OVERRIDE (IP): Performed by: STUDENT IN AN ORGANIZED HEALTH CARE EDUCATION/TRAINING PROGRAM

## 2021-11-30 PROCEDURE — 700102 HCHG RX REV CODE 250 W/ 637 OVERRIDE(OP): Performed by: INTERNAL MEDICINE

## 2021-11-30 PROCEDURE — 700102 HCHG RX REV CODE 250 W/ 637 OVERRIDE(OP): Performed by: STUDENT IN AN ORGANIZED HEALTH CARE EDUCATION/TRAINING PROGRAM

## 2021-11-30 PROCEDURE — 700117 HCHG RX CONTRAST REV CODE 255: Performed by: INTERNAL MEDICINE

## 2021-11-30 PROCEDURE — 700101 HCHG RX REV CODE 250: Performed by: ANESTHESIOLOGY

## 2021-11-30 PROCEDURE — 700105 HCHG RX REV CODE 258: Performed by: ANESTHESIOLOGY

## 2021-11-30 PROCEDURE — 99233 SBSQ HOSP IP/OBS HIGH 50: CPT | Performed by: INTERNAL MEDICINE

## 2021-11-30 PROCEDURE — 700105 HCHG RX REV CODE 258: Performed by: HOSPITALIST

## 2021-11-30 PROCEDURE — 160009 HCHG ANES TIME/MIN: Performed by: INTERNAL MEDICINE

## 2021-11-30 PROCEDURE — A9270 NON-COVERED ITEM OR SERVICE: HCPCS | Performed by: STUDENT IN AN ORGANIZED HEALTH CARE EDUCATION/TRAINING PROGRAM

## 2021-11-30 PROCEDURE — C1769 GUIDE WIRE: HCPCS | Performed by: INTERNAL MEDICINE

## 2021-11-30 PROCEDURE — 0FC98ZZ EXTIRPATION OF MATTER FROM COMMON BILE DUCT, VIA NATURAL OR ARTIFICIAL OPENING ENDOSCOPIC: ICD-10-PCS | Performed by: INTERNAL MEDICINE

## 2021-11-30 PROCEDURE — 160203 HCHG ENDO MINUTES - 1ST 30 MINS LEVEL 4: Performed by: INTERNAL MEDICINE

## 2021-11-30 PROCEDURE — 94640 AIRWAY INHALATION TREATMENT: CPT

## 2021-11-30 PROCEDURE — 110371 HCHG SHELL REV 272: Performed by: INTERNAL MEDICINE

## 2021-11-30 PROCEDURE — 99232 SBSQ HOSP IP/OBS MODERATE 35: CPT | Performed by: SURGERY

## 2021-11-30 PROCEDURE — 85025 COMPLETE CBC W/AUTO DIFF WBC: CPT

## 2021-11-30 PROCEDURE — A9270 NON-COVERED ITEM OR SERVICE: HCPCS | Performed by: INTERNAL MEDICINE

## 2021-11-30 PROCEDURE — 82962 GLUCOSE BLOOD TEST: CPT

## 2021-11-30 PROCEDURE — 770001 HCHG ROOM/CARE - MED/SURG/GYN PRIV*

## 2021-11-30 PROCEDURE — 80053 COMPREHEN METABOLIC PANEL: CPT

## 2021-11-30 PROCEDURE — 160036 HCHG PACU - EA ADDL 30 MINS PHASE I: Performed by: INTERNAL MEDICINE

## 2021-11-30 PROCEDURE — 700105 HCHG RX REV CODE 258: Performed by: STUDENT IN AN ORGANIZED HEALTH CARE EDUCATION/TRAINING PROGRAM

## 2021-11-30 PROCEDURE — 160035 HCHG PACU - 1ST 60 MINS PHASE I: Performed by: INTERNAL MEDICINE

## 2021-11-30 PROCEDURE — 36415 COLL VENOUS BLD VENIPUNCTURE: CPT

## 2021-11-30 PROCEDURE — 700111 HCHG RX REV CODE 636 W/ 250 OVERRIDE (IP): Performed by: ANESTHESIOLOGY

## 2021-11-30 RX ORDER — ROCURONIUM BROMIDE 10 MG/ML
INJECTION, SOLUTION INTRAVENOUS PRN
Status: DISCONTINUED | OUTPATIENT
Start: 2021-11-30 | End: 2021-11-30 | Stop reason: SURG

## 2021-11-30 RX ORDER — HALOPERIDOL 5 MG/ML
1 INJECTION INTRAMUSCULAR
Status: DISCONTINUED | OUTPATIENT
Start: 2021-11-30 | End: 2021-11-30 | Stop reason: HOSPADM

## 2021-11-30 RX ORDER — ONDANSETRON 2 MG/ML
4 INJECTION INTRAMUSCULAR; INTRAVENOUS
Status: DISCONTINUED | OUTPATIENT
Start: 2021-11-30 | End: 2021-11-30 | Stop reason: HOSPADM

## 2021-11-30 RX ORDER — DIPHENHYDRAMINE HYDROCHLORIDE 50 MG/ML
12.5 INJECTION INTRAMUSCULAR; INTRAVENOUS
Status: DISCONTINUED | OUTPATIENT
Start: 2021-11-30 | End: 2021-11-30 | Stop reason: HOSPADM

## 2021-11-30 RX ORDER — ALBUTEROL SULFATE 90 UG/1
2 AEROSOL, METERED RESPIRATORY (INHALATION)
Status: DISCONTINUED | OUTPATIENT
Start: 2021-11-30 | End: 2021-12-03 | Stop reason: HOSPADM

## 2021-11-30 RX ORDER — ALBUTEROL SULFATE 90 UG/1
1 AEROSOL, METERED RESPIRATORY (INHALATION)
Status: DISPENSED | OUTPATIENT
Start: 2021-11-30 | End: 2021-12-02

## 2021-11-30 RX ORDER — SODIUM CHLORIDE, SODIUM LACTATE, POTASSIUM CHLORIDE, CALCIUM CHLORIDE 600; 310; 30; 20 MG/100ML; MG/100ML; MG/100ML; MG/100ML
INJECTION, SOLUTION INTRAVENOUS
Status: DISCONTINUED | OUTPATIENT
Start: 2021-11-30 | End: 2021-11-30 | Stop reason: SURG

## 2021-11-30 RX ORDER — MIDAZOLAM HYDROCHLORIDE 1 MG/ML
INJECTION INTRAMUSCULAR; INTRAVENOUS PRN
Status: DISCONTINUED | OUTPATIENT
Start: 2021-11-30 | End: 2021-11-30 | Stop reason: SURG

## 2021-11-30 RX ADMIN — PROPOFOL 200 MG: 10 INJECTION, EMULSION INTRAVENOUS at 13:08

## 2021-11-30 RX ADMIN — SODIUM CHLORIDE, POTASSIUM CHLORIDE, SODIUM LACTATE AND CALCIUM CHLORIDE: 600; 310; 30; 20 INJECTION, SOLUTION INTRAVENOUS at 13:18

## 2021-11-30 RX ADMIN — PIPERACILLIN AND TAZOBACTAM 4.5 G: 4; .5 INJECTION, POWDER, LYOPHILIZED, FOR SOLUTION INTRAVENOUS; PARENTERAL at 04:37

## 2021-11-30 RX ADMIN — PIPERACILLIN AND TAZOBACTAM 4.5 G: 4; .5 INJECTION, POWDER, LYOPHILIZED, FOR SOLUTION INTRAVENOUS; PARENTERAL at 21:08

## 2021-11-30 RX ADMIN — OXYCODONE HYDROCHLORIDE AND ACETAMINOPHEN 1 TABLET: 5; 325 TABLET ORAL at 15:02

## 2021-11-30 RX ADMIN — PROPOFOL 50 MG: 10 INJECTION, EMULSION INTRAVENOUS at 13:22

## 2021-11-30 RX ADMIN — MIDAZOLAM HYDROCHLORIDE 2 MG: 1 INJECTION, SOLUTION INTRAMUSCULAR; INTRAVENOUS at 13:08

## 2021-11-30 RX ADMIN — HYDROMORPHONE HYDROCHLORIDE 1 MG: 1 INJECTION, SOLUTION INTRAMUSCULAR; INTRAVENOUS; SUBCUTANEOUS at 07:52

## 2021-11-30 RX ADMIN — FENTANYL CITRATE 100 MCG: 50 INJECTION, SOLUTION INTRAMUSCULAR; INTRAVENOUS at 13:19

## 2021-11-30 RX ADMIN — ROCURONIUM BROMIDE 20 MG: 10 INJECTION, SOLUTION INTRAVENOUS at 13:08

## 2021-11-30 RX ADMIN — ALBUTEROL SULFATE 1 PUFF: 90 AEROSOL, METERED RESPIRATORY (INHALATION) at 11:35

## 2021-11-30 RX ADMIN — PIPERACILLIN AND TAZOBACTAM 4.5 G: 4; .5 INJECTION, POWDER, LYOPHILIZED, FOR SOLUTION INTRAVENOUS; PARENTERAL at 15:58

## 2021-11-30 RX ADMIN — HYDROMORPHONE HYDROCHLORIDE 1 MG: 1 INJECTION, SOLUTION INTRAMUSCULAR; INTRAVENOUS; SUBCUTANEOUS at 15:02

## 2021-11-30 RX ADMIN — SODIUM CHLORIDE, SODIUM LACTATE, POTASSIUM CHLORIDE, CALCIUM CHLORIDE AND DEXTROSE MONOHYDRATE: 5; 600; 310; 30; 20 INJECTION, SOLUTION INTRAVENOUS at 07:47

## 2021-11-30 RX ADMIN — ALBUTEROL SULFATE 1 PUFF: 90 AEROSOL, METERED RESPIRATORY (INHALATION) at 22:30

## 2021-11-30 ASSESSMENT — PAIN SCALES - GENERAL: PAIN_LEVEL: 0

## 2021-11-30 ASSESSMENT — ENCOUNTER SYMPTOMS
CLAUDICATION: 0
NERVOUS/ANXIOUS: 0
PALPITATIONS: 0
WHEEZING: 1
HEARTBURN: 0
DOUBLE VISION: 0
ORTHOPNEA: 0
FEVER: 0
SHORTNESS OF BREATH: 1
VOMITING: 0
DEPRESSION: 0
SPUTUM PRODUCTION: 0
FOCAL WEAKNESS: 0
NAUSEA: 0
ABDOMINAL PAIN: 1
HEMOPTYSIS: 0
HEADACHES: 0
SORE THROAT: 0
BLURRED VISION: 0
COUGH: 0

## 2021-11-30 ASSESSMENT — PAIN DESCRIPTION - PAIN TYPE
TYPE: ACUTE PAIN

## 2021-11-30 NOTE — PROCEDURES
DATE OF PROCEDURE:  11/30/2021     PROCEDURES:  Endoscopic retrograde cholangiopancreatography with:  1.  Biliary sphincterotomy.  2.  Bile duct balloon stone extraction.  3.  Radiologic interpretation of static and dynamic fluoroscopic images by   myself at no time was a radiologist present in the room.     INDICATIONS:  Bile duct obstruction, suspected choledocholithiasis and   elevated liver enzymes.     FINAL IMPRESSION:    1.  Biliary ampulla, unremarkable appearance.  2.  Pancreatic duct, neither injected nor cannulated.  3.  Common bile duct, normal course and caliber with normal intrahepatic   biliary tree.  Tiny distal stone/filling defect.     THERAPEUTICS:    1.  A 10 mm biliary sphincterotomy with bow papillotome over covered wire.  2.  Bile duct, extraction of small stone and sludge debris with 12 mm balloon.     RECOMMENDATIONS:    1.  Follow liver enzymes.  2.  Cholecystectomy recommended.     DESCRIPTION OF PROCEDURE:  Prior to the procedure, physical exam was stable.    During procedure, vital signs remained within normal limits.  Prior to   sedation, informed consent was obtained.  Risks, benefits, alternatives   including but not limited to risk of bleeding, infection, perforation, adverse   reaction to medication, failure to identify pathology, pancreatitis and death   explained to the patient who accepted all risks.  The patient was prepped in   the prone position after intubation and sedation by anesthesia.     Scope tip of the Olympus flexible side-viewing TJF duodenoscope passed to the   level of the biliary ampulla in the short position after the gastric pool was   suctioned dry.  Of note, the stomach was large and J-shaped requiring a mild   amount of abdominal pressure to pass the scope.  The biliary ampulla was   unremarkable in appearance.  A CleverCut Olympus sphincterotome with a 0.035   wire was utilized for selective cannulation of the common bile duct.  This was   achieved on the  very first attempt and at no time was the pancreatic duct   injected nor cannulated.  Wire passed along the expected course of the   intrahepatic biliary tree.  Injection of contrast demonstrated normal course   and caliber of the bile duct and normal intrahepatic biliary tree.  There was   a small distal filling defect consistent with a small stone versus sludge.    The wire was left in place and a 10 mm biliary sphincterotomy was performed   with the bow papillotome over the covered wire.  A small amount of sludge was   seen flowing from the duct.  The wire was left in place and a 9-12 mm   occlusion balloon was exchanged over the wire, passed to the common hepatic   duct, inflated to 12 mm and withdrawn down the duct with delivery of a small   1-2 mm stone and sludge.  Repeat sweeps x3 were negative for stones or sludge   and the 12 mm balloon pulled with ease through the sphincterotomy site.    Procedure was deemed complete at this time.  The scope was withdrawn.  Air and   liquid were suctioned.  Patient tolerated the procedure well and was sent to   recovery without immediate complications.        ______________________________  MD PARVEEN CRUZ/KRISHNA    DD:  11/30/2021 13:38  DT:  11/30/2021 14:04    Job#:  [id number]

## 2021-11-30 NOTE — PROGRESS NOTES
Assumed care of patient at 0645. Bedside report received. Assessment complete.  AA&Ox4. Denies CP/SOB.  Reporting 5/10 pain. Medicated per MAR.   Educated patient regarding pharmacologic and non pharmacologic modalities for pain management.  Skin per flowsheet.  Patient is currently NPO. Denies N/V.  + void. - BM. Last BM PTA.  Pt ambulates with SBA.  All needs met at this time. Call light within reach. Pt calls appropriately. Bed low and locked, non skid socks in place. Hourly rounding in place.

## 2021-11-30 NOTE — OR SURGEON
Immediate Post OP Note    PreOp Diagnosis: Bile duct obstruction      PostOp Diagnosis: Same      Procedure(s):  ERCP (ENDOSCOPIC RETROGRADE CHOLANGIOPANCREATOGRAPHY) - Wound Class: Clean Contaminated    Surgeon(s):  Gino Cristobal M.D.    Anesthesiologist/Type of Anesthesia:  Anesthesiologist: Javy Taylor M.D./General    Surgical Staff:  Circulator: Shai Aranda R.N.  Endoscopy Technician: Jesusita Cochran  Radiology Technologist: Bruno Bueno  Endoscopy Nurse: Monserrat Sher R.N.    Specimens removed if any:  * No specimens in log *    Dr. Cristobal  [facility]  [place]  [phone]    ERCP with: Biliary sphincterotomy    Bile duct stone extraction    Radiologic interpretation of static and dynamic fluoroscopic images    Indication: Bile duct obstruction suspected choledocholithiasis    Sedation: GA (Dr. Taylor)    Findings:    Ampulla     Unremarkable appearance    Pancreatic duct     Neither injected nor cannulated    CBD     Normal course and caliber with normal intrahepatic biliary tree     Tiny distal stone/filling defect     Therapeutics    10 mm biliary sphincterotomy with bow papillotome over covered wire    Bile duct balloon stone and sludge extraction    Plan:  Follow liver enzymes    Cholecystectomy recommended      11/30/2021 1:29 PM Gino Cristobal M.D.

## 2021-11-30 NOTE — ANESTHESIA POSTPROCEDURE EVALUATION
Patient: Alec Murrell    Procedure Summary     Date: 11/30/21 Room / Location: [facility] ROOM 26 / SURGERY SAME DAY [facility]    Anesthesia Start: 1306 Anesthesia Stop: 1334    Procedure: ERCP (ENDOSCOPIC RETROGRADE CHOLANGIOPANCREATOGRAPHY) (Left Esophagus) Diagnosis: (Choledocholithiasis)    Surgeons: Gino Cristobal M.D. Responsible Provider: Javy Taylor M.D.    Anesthesia Type: general ASA Status: 2          Final Anesthesia Type: general  Last vitals  BP   Blood Pressure: (!) 82/52    Temp   36.3 °C (97.4 °F)    Pulse   79   Resp   16    SpO2   98 %      Anesthesia Post Evaluation    Patient location during evaluation: PACU  Patient participation: complete - patient participated  Level of consciousness: awake and alert  Pain score: 0    Airway patency: patent  Anesthetic complications: no  Cardiovascular status: hemodynamically stable  Respiratory status: acceptable  Hydration status: euvolemic    PONV: none          No complications documented.     Nurse Pain Score: 2 (NPRS)

## 2021-11-30 NOTE — OR NURSING
1333 Pt arrived from OR, rec'd report from  and RN. VSS. Oral airway in place, no s/s of resp distress. Pt sleepy.     1349 Oral airway d/c'd, pt tolerated well.  at bedside.     1405 Pt denies needs.     1435 Report to AIMEE Friend. Warm blanket applied for comfort.     1500 Pt still awaiting transport. Pt medicated for 8/10 pain. AIMEE Friend made aware.     1510 Pt transported via rney with RN and CNA. Pt reported some pain relief.

## 2021-11-30 NOTE — CARE PLAN
The patient is Stable - Low risk of patient condition declining or worsening    Shift Goals  Clinical Goals: pain control  Patient Goals: prepare for ERCP    Progress made toward(s) clinical / shift goals:  Pain has been controlled through rest. Patient has not complained of pain during the shift. Patient has been preparing for planned ERCP and at this time there are no further questions.       Problem: Pain - Standard  Goal: Alleviation of pain or a reduction in pain to the patient’s comfort goal  Outcome: Progressing     Problem: Knowledge Deficit - Standard  Goal: Patient and family/care givers will demonstrate understanding of plan of care, disease process/condition, diagnostic tests and medications  Outcome: Progressing       Patient is not progressing towards the following goals:

## 2021-11-30 NOTE — PROGRESS NOTES
GI ATTENDING:    Patient seen and examined.  Chart reviewed.  Therapeutic ERCP explained at length.    Patient expressed understanding and requested to proceed with ERCP.    Consenting person was given an opportunity to ask questions and discuss other options.  Risks including but not limited to pancreatitis, contrast reaction, stent migration and/or occlusion, perforation, infection, bleeding, missed lesion(s), cardiac and/or pulmonary event, aspiration, stroke, possible need for surgery and/or interventional radiology, hospitalization possibly prolonged, discomfort, unsuccessful and/or incomplete procedure, indefinite diagnosis, ineffective therapy and/or persistent symptoms, possible need for repeat procedures and/or additional testings, damage to adjacent organs and/or vascular structures, medication reaction, disability, death, and other adverse events possibly life-threatening.  Discussion was undertaken with Layman's terms.  Consenting person stated understanding and acceptance of these risks, and wished to proceed.  Informed consent was given in clear state of mind.

## 2021-11-30 NOTE — ANESTHESIA PROCEDURE NOTES
Airway    Date/Time: 11/30/2021 1:10 PM  Performed by: Javy Taylor M.D.  Authorized by: Javy Taylor M.D.     Location:  OR  Urgency:  Elective  Indications for Airway Management:  Anesthesia      Spontaneous Ventilation: absent    Sedation Level:  Deep  Preoxygenated: Yes    Patient Position:  Sniffing  Final Airway Type:  Endotracheal airway  Final Endotracheal Airway:  ETT  Cuffed: Yes    Technique Used for Successful ETT Placement:  Direct laryngoscopy    Insertion Site:  Oral  Blade Type:  Rosalee  Laryngoscope Blade/Videolaryngoscope Blade Size:  3  ETT Size (mm):  7.0  Measured from:  Teeth  ETT to Teeth (cm):  24  Placement Verified by: auscultation and capnometry    Cormack-Lehane Classification:  Grade I - full view of glottis  Number of Attempts at Approach:  1

## 2021-11-30 NOTE — PROGRESS NOTES
Hospital Medicine Daily Progress Note    Date of Service  11/30/2021    Chief Complaint  Alec Murrell is a 51 y.o. male admitted 11/28/2021 with   Chief Complaint   Patient presents with   • Epigastric Pain   • Other     [facility] and was told to come to the ER for possible gallbladder issue         Hospital Course  This is 51-year-old male with a past medical history significant for obesity presented to the ER on 11/20/2021 with a complaint of abdominal pain that started 4 weeks ago; continue to get worse progressively.    He has been to urgent care x2, primary care physician's office.  Patient stated that decreased p.o. intake abdominal normal/epigastric pain 10 out of 10 in intensity.  Upon presentation in ER, he was noted to be hypertensive, WBC 12.7 hemoglobin 18.4, platelet 373 sodium 130, AST//29 1507, T-Bili  4.5;   AST/ALT/ALP/T-BILI  265/363/319/7.5    Blood Cx  patient is a started on broad-spectrum antibiotics including Zosyn. US of the abdomen showing Gallbladder sludge and stones with mild wall thickening and positive sonographic Bazzi's sign is suspicious for acute cholecystitis; surgery consulted.    MRI of abdomen showing No discrete signal void within the common bile duct to indicate stone, however heterogeneous decreased T2 signal may indicate sludge or debris. Gi Dr Carlos called who will be coming and evaluating the patient .    Interval Problem Update  Gallbladder issues-continued RUQ pain, does not radiate to the back. Going for ERCP. Tbili slightly worse today. Remains afebrile.      I have personally seen and examined the patient at bedside. I discussed the plan of care with patient, family, bedside RN, charge RN, , pharmacy and general surgery and GI.    Consultants/Specialty  general surgery and GI    Code Status  Full Code    Disposition  Patient is not medically cleared.   Anticipate discharge to to home with close outpatient follow-up.  I have  placed the appropriate orders for post-discharge needs.    Review of Systems  Review of Systems   Constitutional: Negative for fever and malaise/fatigue.   HENT: Negative for congestion and sore throat.    Eyes: Negative for blurred vision and double vision.   Respiratory: Positive for shortness of breath and wheezing. Negative for cough, hemoptysis and sputum production.         Mild   Cardiovascular: Negative for chest pain, palpitations, orthopnea and claudication.   Gastrointestinal: Positive for abdominal pain (RUQ). Negative for heartburn, nausea and vomiting.   Genitourinary: Negative for dysuria.   Neurological: Negative for focal weakness and headaches.   Psychiatric/Behavioral: Negative for depression. The patient is not nervous/anxious.    All other systems reviewed and are negative.       Physical Exam  Temp:  [35.9 °C (96.6 °F)-36.9 °C (98.4 °F)] 36.1 °C (97 °F)  Pulse:  [61-97] 64  Resp:  [17-18] 18  BP: (108-150)/(61-89) 111/61  SpO2:  [91 %-97 %] 97 %    Physical Exam  Vitals and nursing note reviewed.   Constitutional:       Appearance: He is obese.      Comments: Slight conversational dyspnea   HENT:      Head: Normocephalic and atraumatic.      Nose:      Comments: NC in place  Eyes:      General: Scleral icterus present.      Extraocular Movements: Extraocular movements intact.      Pupils: Pupils are equal, round, and reactive to light.   Cardiovascular:      Rate and Rhythm: Normal rate.      Pulses: Normal pulses.      Heart sounds: No murmur heard.      Pulmonary:      Effort: Pulmonary effort is normal. No respiratory distress.      Breath sounds: Wheezing (diffuse, mostly expiratory) present.   Abdominal:      General: There is no distension.      Palpations: Abdomen is soft.      Tenderness: There is abdominal tenderness. There is no guarding.   Musculoskeletal:      Cervical back: Neck supple.      Right lower leg: No edema.      Left lower leg: No edema.   Skin:     General: Skin is  warm.   Neurological:      Mental Status: He is oriented to person, place, and time.      Cranial Nerves: No cranial nerve deficit.   Psychiatric:         Mood and Affect: Mood normal.         Fluids    Intake/Output Summary (Last 24 hours) at 11/30/2021 1246  Last data filed at 11/30/2021 0735  Gross per 24 hour   Intake 1937.5 ml   Output 2501 ml   Net -563.5 ml       Laboratory  Recent Labs     11/28/21  1613 11/29/21  1142 11/30/21  0312   WBC 12.7* 12.3* 7.8   RBC 6.03 5.44 5.57   HEMOGLOBIN 18.4* 16.5 16.7   HEMATOCRIT 52.4* 48.4 50.2   MCV 86.9 89.0 90.1   MCH 30.5 30.3 30.0   MCHC 35.1 34.1 33.3*   RDW 39.5 41.6 42.4   PLATELETCT 473* 396 321   MPV 9.0 9.0 9.5     Recent Labs     11/28/21  1613 11/29/21  0936 11/30/21  0312   SODIUM 130* 136 135   POTASSIUM 4.7 4.4 4.0   CHLORIDE 96 99 100   CO2 22 27 25   GLUCOSE 196* 137* 128*   BUN 12 10 10   CREATININE 0.72 0.66 0.64   CALCIUM 10.0 9.1 9.2     Recent Labs     11/29/21  1754   INR 1.15*               Imaging  [id number]   Final Result      1.  Colonic wall thickening with stones and probable sludge consistent with acute cholecystitis.   2.  No discrete signal void within the common bile duct to indicate stone, however heterogeneous decreased T2 signal may indicate sludge or debris.   3.  No significant biliary dilation.      US-RUQ   Final Result      1.  Gallbladder sludge and stones with mild wall thickening and positive sonographic Bazzi's sign is suspicious for acute cholecystitis.   2.  Hepatic steatosis.      DX-CHEST-PORTABLE (1 VIEW)   Final Result      No evidence of acute cardiopulmonary process.      DX-PORTABLE FLUOROSCOPY < 1 HOUR    (Results Pending)        Assessment/Plan  * Cholecystitis- (present on admission)  Assessment & Plan  Admit patient to surgical floor  Blood Cx X 2  ngtd, General surgery following    -- continue iV abx   -possible lap shonna after ERCP    Mild intermittent asthma with exacerbation- (present on  admission)  Assessment & Plan  -see below  -RT  -reduce IVF's to 75 mL/hour, appears euvolemic on exam  -check PA/lateral CXR    Acute respiratory failure with hypoxia (HCC)- (present on admission)  Assessment & Plan  -profound expiratory wheezing on exam  -start scheduled albuterol  -RT protocol  -try to wean    Hepatic steatosis- (present on admission)  Assessment & Plan  Life style modification including diet/ exercise and weight loss     Choledocholithiasis- (present on admission)  Assessment & Plan  No discrete signal void within the common bile duct to indicate stone, however heterogeneous decreased T2 signal may indicate sludge or debris.  -labs indicative of obstruction with worsening t bili  -ERCP today  -empiric IV zosyn      Transaminitis- (present on admission)  Assessment & Plan  Likely 2/2 cholecystitis   --AST/ALT/ALP/T-BILI  265/363/319/7.5   -- GI/general surgery called    Hyperglycemia- (present on admission)  Assessment & Plan  A1c 5.8, blood sugars improved  -stop accuchecks  -monitor closely    Morbid obesity (HCC)- (present on admission)  Assessment & Plan  15 minutes spent counseling patient on weight loss, nutrition, and healthy lifestyle         VTE prophylaxis: SCDs/TEDs

## 2021-11-30 NOTE — CARE PLAN
Problem: Pain - Standard  Goal: Alleviation of pain or a reduction in pain to the patient’s comfort goal  Outcome: Progressing     Problem: Knowledge Deficit - Standard  Goal: Patient and family/care givers will demonstrate understanding of plan of care, disease process/condition, diagnostic tests and medications  Outcome: Progressing     The patient is Stable - Low risk of patient condition declining or worsening    Shift Goals  Clinical Goals: Clarify plan of care  Patient Goals: Pain control, rest/comfort  Family Goals: Updates    Progress made toward(s) clinical / shift goals:  ERCP scheduled tomorrow. Pt and family updated.     Patient is not progressing towards the following goals:

## 2021-11-30 NOTE — ANESTHESIA PREPROCEDURE EVALUATION
Case: [id number] Date/Time: 11/30/21 1245    Procedure: ERCP (ENDOSCOPIC RETROGRADE CHOLANGIOPANCREATOGRAPHY)    Location: [facility] ROOM 26 / SURGERY SAME DAY [facility]    Surgeons: Gino Cristobal M.D.          Relevant Problems      (positive) Hepatic steatosis       Physical Exam    Airway   Mallampati: II  TM distance: >3 FB  Neck ROM: full       Cardiovascular - normal exam  Rhythm: regular  Rate: normal  (-) murmur     Dental - normal exam           Pulmonary - normal exam  Breath sounds clear to auscultation     Abdominal    Neurological - normal exam                 Anesthesia Plan    ASA 2       Plan - general       Airway plan will be ETT        Plan Factors:   Patient was previously instructed to abstain from smoking on day of procedure.  Patient did not smoke on day of procedure.      Induction: intravenous    Postoperative Plan: Postoperative administration of opioids is intended.    Pertinent diagnostic labs and testing reviewed    Informed Consent:    Anesthetic plan and risks discussed with patient.    Use of blood products discussed with: patient whom consented to blood products.

## 2021-11-30 NOTE — PROGRESS NOTES
"  DATE: 11/30/2021    ACS Blue service progress note    Hospital Day 3 RUQ pain / elevated T bili - possible choledocholithiasis.    Interval Events:    Ongoing right upper quadrant pain.  MRCP with abnormality within the common bile duct (possible sludge).  Total bilirubin 7.6.    Antibiotic therapy, yes.   GI note reviewed.    PHYSICAL EXAMINATION:  Constitutional:     Vital Signs: /61   Pulse 73   Temp 36.1 °C (96.9 °F) (Temporal)   Resp 17   Ht 1.651 m (5' 5\")   Wt 89.9 kg (198 lb 3.1 oz)   SpO2 94%    General Appearance: The patient is a pleasant , cooperative and calm appearing man in no critical distress.  HEENT:    The pupils are equal, round, and reactive to light bilaterally. The extraocular muscles are intact bilaterally. The nares and oropharynx are clear.   Respiratory:   Inspection: Unlabored respirations, no intercostal retractions, paradoxical motion, or accessory muscle use.   Palpation:  The chest is nontender.    Auscultation: expiratory wheezes.  Cardiovascular:   Inspection: The skin is warm and dry.  Auscultation: Regular rate and rhythm.   Peripheral Pulses: Normal.   Abdomen:   Inspection: Abdominal inspection reveals no abrasions, contusions, lacerations or penetrating wounds.   Palpation: Palpation is remarkable for mild tenderness in the right upper quadrant  region.No rebound tenderness or guarding.  Musculoskeletal:   Examination of the upper and lower extremities reveals no gross deformities.  Skin:    Examination of the skin reveals no significant abrasions, contusion, lacerations, or other soft tissue injury.  Neurologic:    Neurologic examination reveals no focal deficits noted, mental status intact.    Laboratory Values:   Recent Labs     11/28/21  1613 11/29/21  1142 11/30/21  0312   WBC 12.7* 12.3* 7.8   RBC 6.03 5.44 5.57   HEMOGLOBIN 18.4* 16.5 16.7   HEMATOCRIT 52.4* 48.4 50.2   MCV 86.9 89.0 90.1   MCH 30.5 30.3 30.0   MCHC 35.1 34.1 33.3*   RDW 39.5 41.6 42.4 "   PLATELETCT 473* 396 321   MPV 9.0 9.0 9.5     Recent Labs     11/28/21  1613 11/29/21  0936 11/30/21  0312   SODIUM 130* 136 135   POTASSIUM 4.7 4.4 4.0   CHLORIDE 96 99 100   CO2 22 27 25   GLUCOSE 196* 137* 128*   BUN 12 10 10   CREATININE 0.72 0.66 0.64   CALCIUM 10.0 9.1 9.2     Recent Labs     11/28/21  1613 11/29/21  0936 11/29/21  1754 11/30/21  0312   ASTSGOT 476* 265*  --  104*   ALTSGPT 329* 363*  --  240*   TBILIRUBIN 5.5* 7.5*  --  7.6*   IBILIRUBIN 1.5*  --   --   --    DBILIRUBIN 4.0*  --   --   --    ALKPHOSPHAT 307* 319*  --  301*   GLOBULIN 4.0* 3.1  --  3.3   INR  --   --  1.15*  --      Recent Labs     11/29/21  1754   INR 1.15*        Imaging:   [id number]   Final Result      1.  Colonic wall thickening with stones and probable sludge consistent with acute cholecystitis.   2.  No discrete signal void within the common bile duct to indicate stone, however heterogeneous decreased T2 signal may indicate sludge or debris.   3.  No significant biliary dilation.      US-RUQ   Final Result      1.  Gallbladder sludge and stones with mild wall thickening and positive sonographic Bazzi's sign is suspicious for acute cholecystitis.   2.  Hepatic steatosis.      DX-CHEST-PORTABLE (1 VIEW)   Final Result      No evidence of acute cardiopulmonary process.          ASSESSMENT AND PLAN:     * Cholecystitis- (present on admission)  Assessment & Plan  US shows Gallbladder sludge and stones with mild wall thickening and positive sonographic Bazzi's sign is suspicious for acute cholecystitis  LFTs elevated   MRCP pending  IV abx    Choledocholithiasis- (present on admission)  Assessment & Plan  Admission T bili 5.5  11/29 T bili 7.5  MRCP:  No discrete signal void within the common bile duct to indicate stone, decreased T2 signal may indicate sludge or debris. No significant biliary dilation.  11/30 ERCP        DISPOSITION:     ERCP today   Continue antibiotic therapy.  Surgery to continue to follow.         ____________________________________     Nic NOELLE Gonzalez    DD: 11/30/2021  9:39 AM

## 2021-12-01 LAB
ALBUMIN SERPL BCP-MCNC: 3.5 G/DL (ref 3.2–4.9)
ALBUMIN/GLOB SERPL: 1.4 G/DL
ALP SERPL-CCNC: 244 U/L (ref 30–99)
ALT SERPL-CCNC: 140 U/L (ref 2–50)
ANION GAP SERPL CALC-SCNC: 7 MMOL/L (ref 7–16)
AST SERPL-CCNC: 40 U/L (ref 12–45)
BASOPHILS # BLD AUTO: 0.3 % (ref 0–1.8)
BASOPHILS # BLD: 0.02 K/UL (ref 0–0.12)
BILIRUB SERPL-MCNC: 4.4 MG/DL (ref 0.1–1.5)
BUN SERPL-MCNC: 11 MG/DL (ref 8–22)
CALCIUM SERPL-MCNC: 9.2 MG/DL (ref 8.5–10.5)
CHLORIDE SERPL-SCNC: 105 MMOL/L (ref 96–112)
CO2 SERPL-SCNC: 27 MMOL/L (ref 20–33)
CREAT SERPL-MCNC: 0.72 MG/DL (ref 0.5–1.4)
EOSINOPHIL # BLD AUTO: 0.06 K/UL (ref 0–0.51)
EOSINOPHIL NFR BLD: 0.9 % (ref 0–6.9)
ERYTHROCYTE [DISTWIDTH] IN BLOOD BY AUTOMATED COUNT: 42.7 FL (ref 35.9–50)
GLOBULIN SER CALC-MCNC: 2.5 G/DL (ref 1.9–3.5)
GLUCOSE SERPL-MCNC: 114 MG/DL (ref 65–99)
HCT VFR BLD AUTO: 42.9 % (ref 42–52)
HGB BLD-MCNC: 14.4 G/DL (ref 14–18)
IMM GRANULOCYTES # BLD AUTO: 0.01 K/UL (ref 0–0.11)
IMM GRANULOCYTES NFR BLD AUTO: 0.1 % (ref 0–0.9)
LIPASE SERPL-CCNC: 93 U/L (ref 11–82)
LYMPHOCYTES # BLD AUTO: 1.97 K/UL (ref 1–4.8)
LYMPHOCYTES NFR BLD: 28.9 % (ref 22–41)
MCH RBC QN AUTO: 29.9 PG (ref 27–33)
MCHC RBC AUTO-ENTMCNC: 33.6 G/DL (ref 33.7–35.3)
MCV RBC AUTO: 89.2 FL (ref 81.4–97.8)
MONOCYTES # BLD AUTO: 0.52 K/UL (ref 0–0.85)
MONOCYTES NFR BLD AUTO: 7.6 % (ref 0–13.4)
NEUTROPHILS # BLD AUTO: 4.24 K/UL (ref 1.82–7.42)
NEUTROPHILS NFR BLD: 62.2 % (ref 44–72)
NRBC # BLD AUTO: 0 K/UL
NRBC BLD-RTO: 0 /100 WBC
PLATELET # BLD AUTO: 379 K/UL (ref 164–446)
PMV BLD AUTO: 9.3 FL (ref 9–12.9)
POTASSIUM SERPL-SCNC: 3.9 MMOL/L (ref 3.6–5.5)
PROT SERPL-MCNC: 6 G/DL (ref 6–8.2)
RBC # BLD AUTO: 4.81 M/UL (ref 4.7–6.1)
SODIUM SERPL-SCNC: 139 MMOL/L (ref 135–145)
WBC # BLD AUTO: 6.8 K/UL (ref 4.8–10.8)

## 2021-12-01 PROCEDURE — 94640 AIRWAY INHALATION TREATMENT: CPT

## 2021-12-01 PROCEDURE — A9270 NON-COVERED ITEM OR SERVICE: HCPCS | Performed by: STUDENT IN AN ORGANIZED HEALTH CARE EDUCATION/TRAINING PROGRAM

## 2021-12-01 PROCEDURE — 99232 SBSQ HOSP IP/OBS MODERATE 35: CPT | Performed by: INTERNAL MEDICINE

## 2021-12-01 PROCEDURE — 99232 SBSQ HOSP IP/OBS MODERATE 35: CPT | Mod: 57 | Performed by: SURGERY

## 2021-12-01 PROCEDURE — 770001 HCHG ROOM/CARE - MED/SURG/GYN PRIV*

## 2021-12-01 PROCEDURE — 700105 HCHG RX REV CODE 258: Performed by: INTERNAL MEDICINE

## 2021-12-01 PROCEDURE — 700111 HCHG RX REV CODE 636 W/ 250 OVERRIDE (IP): Performed by: STUDENT IN AN ORGANIZED HEALTH CARE EDUCATION/TRAINING PROGRAM

## 2021-12-01 PROCEDURE — 83690 ASSAY OF LIPASE: CPT

## 2021-12-01 PROCEDURE — 36415 COLL VENOUS BLD VENIPUNCTURE: CPT

## 2021-12-01 PROCEDURE — A9270 NON-COVERED ITEM OR SERVICE: HCPCS | Performed by: SURGERY

## 2021-12-01 PROCEDURE — 700111 HCHG RX REV CODE 636 W/ 250 OVERRIDE (IP): Performed by: SURGERY

## 2021-12-01 PROCEDURE — 700102 HCHG RX REV CODE 250 W/ 637 OVERRIDE(OP): Performed by: SURGERY

## 2021-12-01 PROCEDURE — 85025 COMPLETE CBC W/AUTO DIFF WBC: CPT

## 2021-12-01 PROCEDURE — 700105 HCHG RX REV CODE 258: Performed by: STUDENT IN AN ORGANIZED HEALTH CARE EDUCATION/TRAINING PROGRAM

## 2021-12-01 PROCEDURE — 700102 HCHG RX REV CODE 250 W/ 637 OVERRIDE(OP): Performed by: STUDENT IN AN ORGANIZED HEALTH CARE EDUCATION/TRAINING PROGRAM

## 2021-12-01 PROCEDURE — 80053 COMPREHEN METABOLIC PANEL: CPT

## 2021-12-01 RX ORDER — ACETAMINOPHEN 500 MG
1000 TABLET ORAL EVERY 6 HOURS
Status: DISCONTINUED | OUTPATIENT
Start: 2021-12-01 | End: 2021-12-03 | Stop reason: HOSPADM

## 2021-12-01 RX ORDER — CELECOXIB 200 MG/1
200 CAPSULE ORAL 2 TIMES DAILY
Status: DISCONTINUED | OUTPATIENT
Start: 2021-12-01 | End: 2021-12-03 | Stop reason: HOSPADM

## 2021-12-01 RX ORDER — OXYCODONE HYDROCHLORIDE 5 MG/1
5 TABLET ORAL EVERY 4 HOURS PRN
Status: DISCONTINUED | OUTPATIENT
Start: 2021-12-01 | End: 2021-12-03 | Stop reason: HOSPADM

## 2021-12-01 RX ADMIN — CELECOXIB 200 MG: 200 CAPSULE ORAL at 12:43

## 2021-12-01 RX ADMIN — SODIUM CHLORIDE, SODIUM LACTATE, POTASSIUM CHLORIDE, CALCIUM CHLORIDE AND DEXTROSE MONOHYDRATE: 5; 600; 310; 30; 20 INJECTION, SOLUTION INTRAVENOUS at 09:30

## 2021-12-01 RX ADMIN — CELECOXIB 200 MG: 200 CAPSULE ORAL at 16:37

## 2021-12-01 RX ADMIN — ALBUTEROL SULFATE 1 PUFF: 90 AEROSOL, METERED RESPIRATORY (INHALATION) at 22:00

## 2021-12-01 RX ADMIN — PIPERACILLIN AND TAZOBACTAM 4.5 G: 4; .5 INJECTION, POWDER, LYOPHILIZED, FOR SOLUTION INTRAVENOUS; PARENTERAL at 20:46

## 2021-12-01 RX ADMIN — ENOXAPARIN SODIUM 40 MG: 40 INJECTION SUBCUTANEOUS at 12:42

## 2021-12-01 RX ADMIN — ALBUTEROL SULFATE 1 PUFF: 90 AEROSOL, METERED RESPIRATORY (INHALATION) at 07:00

## 2021-12-01 RX ADMIN — OXYCODONE HYDROCHLORIDE AND ACETAMINOPHEN 1 TABLET: 5; 325 TABLET ORAL at 09:30

## 2021-12-01 RX ADMIN — ACETAMINOPHEN 1000 MG: 500 TABLET ORAL at 12:43

## 2021-12-01 RX ADMIN — PIPERACILLIN AND TAZOBACTAM 4.5 G: 4; .5 INJECTION, POWDER, LYOPHILIZED, FOR SOLUTION INTRAVENOUS; PARENTERAL at 12:43

## 2021-12-01 RX ADMIN — ACETAMINOPHEN 1000 MG: 500 TABLET ORAL at 16:37

## 2021-12-01 RX ADMIN — PIPERACILLIN AND TAZOBACTAM 4.5 G: 4; .5 INJECTION, POWDER, LYOPHILIZED, FOR SOLUTION INTRAVENOUS; PARENTERAL at 05:40

## 2021-12-01 ASSESSMENT — ENCOUNTER SYMPTOMS
HEMOPTYSIS: 0
SORE THROAT: 0
CARDIOVASCULAR NEGATIVE: 1
HEARTBURN: 0
VOMITING: 0
COUGH: 0
RESPIRATORY NEGATIVE: 1
DEPRESSION: 0
HEADACHES: 0
SHORTNESS OF BREATH: 0
PALPITATIONS: 0
BLURRED VISION: 0
PSYCHIATRIC NEGATIVE: 1
DOUBLE VISION: 0
NERVOUS/ANXIOUS: 0
WHEEZING: 0
WEAKNESS: 0
ABDOMINAL PAIN: 1
NAUSEA: 0
FOCAL WEAKNESS: 0
EYES NEGATIVE: 1
MUSCULOSKELETAL NEGATIVE: 1
SPUTUM PRODUCTION: 0
CHILLS: 0
CLAUDICATION: 0
ORTHOPNEA: 0
FEVER: 0

## 2021-12-01 ASSESSMENT — PAIN DESCRIPTION - PAIN TYPE
TYPE: ACUTE PAIN

## 2021-12-01 NOTE — CARE PLAN
Problem: Pain - Standard  Goal: Alleviation of pain or a reduction in pain to the patient’s comfort goal  Outcome: Progressing     Problem: Knowledge Deficit - Standard  Goal: Patient and family/care givers will demonstrate understanding of plan of care, disease process/condition, diagnostic tests and medications  Outcome: Progressing     The patient is Stable - Low risk of patient condition declining or worsening    Shift Goals  Clinical Goals: Prepare for ERCP  Patient Goals: Prepare for ERCP  Family Goals: Updates    Progress made toward(s) clinical / shift goals:  ERCP complete.    Patient is not progressing towards the following goals:       n/a

## 2021-12-01 NOTE — CARE PLAN
The patient is Stable - Low risk of patient condition declining or worsening    Shift Goals  Clinical Goals: pain control   Patient Goals: pain control, rest   Family Goals: Updates    Progress made toward(s) clinical / shift goals:  Pt declines pain interventions. Resting during shift    Patient is not progressing towards the following goals:      Problem: Pain - Standard  Goal: Alleviation of pain or a reduction in pain to the patient’s comfort goal  Outcome: Progressing  Note: Pt declines pain intervention. Educated on pain scales and PRN medications as well as non-pharmacologic measures

## 2021-12-01 NOTE — PROGRESS NOTES
Hospital Medicine Daily Progress Note    Date of Service  12/1/2021    Chief Complaint  Alec Murrell is a 51 y.o. male admitted 11/28/2021 with   Chief Complaint   Patient presents with   • Epigastric Pain   • Other     [facility] was told to come to the ER for possible gallbladder issue         Hospital Course  This is 51-year-old male with a past medical history significant for obesity presented to the ER on 11/20/2021 with a complaint of abdominal pain that started 4 weeks ago; continue to get worse progressively.    He has been to urgent care x2, primary care physician's office.  Patient stated that decreased p.o. intake abdominal normal/epigastric pain 10 out of 10 in intensity.  Upon presentation in ER, he was noted to be hypertensive, WBC 12.7 hemoglobin 18.4, platelet 373 sodium 130, AST//29 1507, T-Bili  4.5;   AST/ALT/ALP/T-BILI  265/363/319/7.5    Blood Cx  patient is a started on broad-spectrum antibiotics including Zosyn. US of the abdomen showing Gallbladder sludge and stones with mild wall thickening and positive sonographic Bazzi's sign is suspicious for acute cholecystitis; surgery consulted.    MRI of abdomen showing No discrete signal void within the common bile duct to indicate stone, however heterogeneous decreased T2 signal may indicate sludge or debris. Gi Dr Carlos called who will be coming and evaluating the patient .    Interval Problem Update  Gallbladder issues-ERCP successful, labs improving, mild RUQ pain. Lap shonna today?    Wheezing-resolved. Off oxygen now and has nearly no SOB today.     I have personally seen and examined the patient at bedside. I discussed the plan of care with patient, family, bedside RN, charge RN, , pharmacy and general surgery and GI.    Consultants/Specialty  general surgery and GI    Code Status  Full Code    Disposition  Patient is not medically cleared.   Anticipate discharge to to home with close outpatient  follow-up.  I have placed the appropriate orders for post-discharge needs.    Review of Systems  Review of Systems   Constitutional: Negative for fever and malaise/fatigue.   HENT: Negative for congestion and sore throat.    Eyes: Negative for blurred vision and double vision.   Respiratory: Negative for cough, hemoptysis, sputum production, shortness of breath and wheezing.    Cardiovascular: Negative for chest pain, palpitations, orthopnea and claudication.   Gastrointestinal: Positive for abdominal pain (RUQ, decreased). Negative for heartburn, nausea and vomiting.   Genitourinary: Negative for dysuria.   Neurological: Negative for focal weakness and headaches.   Psychiatric/Behavioral: Negative for depression. The patient is not nervous/anxious.    All other systems reviewed and are negative.       Physical Exam  Temp:  [36.1 °C (97 °F)-37.1 °C (98.7 °F)] 36.7 °C (98 °F)  Pulse:  [51-79] 65  Resp:  [16-18] 18  BP: ()/() 118/73  SpO2:  [92 %-98 %] 96 %    Physical Exam  Vitals and nursing note reviewed.   Constitutional:       Appearance: He is obese.      Comments: Speaking in full sentences without issue   HENT:      Head: Normocephalic and atraumatic.   Eyes:      General: Scleral icterus (decreased intensity) present.      Extraocular Movements: Extraocular movements intact.      Pupils: Pupils are equal, round, and reactive to light.   Cardiovascular:      Rate and Rhythm: Normal rate.      Pulses: Normal pulses.      Heart sounds: No murmur heard.      Pulmonary:      Effort: Pulmonary effort is normal. No respiratory distress.      Breath sounds: No wheezing.   Abdominal:      General: There is no distension.      Palpations: Abdomen is soft.      Tenderness: There is abdominal tenderness (mild). There is no guarding.   Musculoskeletal:      Cervical back: Neck supple.      Right lower leg: No edema.      Left lower leg: No edema.   Skin:     General: Skin is warm.   Neurological:      Mental  Status: He is oriented to person, place, and time.      Cranial Nerves: No cranial nerve deficit.   Psychiatric:         Mood and Affect: Mood normal.         Fluids    Intake/Output Summary (Last 24 hours) at 12/1/2021 1136  Last data filed at 12/1/2021 0901  Gross per 24 hour   Intake 490 ml   Output 3575 ml   Net -3085 ml       Laboratory  Recent Labs     11/29/21  1142 11/30/21  0312 12/01/21  0336   WBC 12.3* 7.8 6.8   RBC 5.44 5.57 4.81   HEMOGLOBIN 16.5 16.7 14.4   HEMATOCRIT 48.4 50.2 42.9   MCV 89.0 90.1 89.2   MCH 30.3 30.0 29.9   MCHC 34.1 33.3* 33.6*   RDW 41.6 42.4 42.7   PLATELETCT 396 321 379   MPV 9.0 9.5 9.3     Recent Labs     11/29/21  0936 11/30/21  0312 12/01/21  0336   SODIUM 136 135 139   POTASSIUM 4.4 4.0 3.9   CHLORIDE 99 100 105   CO2 27 25 27   GLUCOSE 137* 128* 114*   BUN 10 10 11   CREATININE 0.66 0.64 0.72   CALCIUM 9.1 9.2 9.2     Recent Labs     11/29/21  1754   INR 1.15*               Imaging  DX-PORTABLE FLUOROSCOPY < 1 HOUR   Final Result      Portable fluoroscopy utilized for 13 seconds.         INTERPRETING LOCATION: [address] [place] [address], [id number]      [id number]   Final Result      1.  Colonic wall thickening with stones and probable sludge consistent with acute cholecystitis.   2.  No discrete signal void within the common bile duct to indicate stone, however heterogeneous decreased T2 signal may indicate sludge or debris.   3.  No significant biliary dilation.      US-RUQ   Final Result      1.  Gallbladder sludge and stones with mild wall thickening and positive sonographic Bazzi's sign is suspicious for acute cholecystitis.   2.  Hepatic steatosis.      DX-CHEST-PORTABLE (1 VIEW)   Final Result      No evidence of acute cardiopulmonary process.           Assessment/Plan  * Cholecystitis- (present on admission)  Assessment & Plan  Admit patient to surgical floor  Blood Cx X 2  ngtd, General surgery following    -- continue iV abx   -Lap shonna on 12/1?    Mild intermittent  asthma with exacerbation- (present on admission)  Assessment & Plan  resolved    Acute respiratory failure with hypoxia (HCC)- (present on admission)  Assessment & Plan  resolved    Hepatic steatosis- (present on admission)  Assessment & Plan  Life style modification including diet/ exercise and weight loss     Choledocholithiasis- (present on admission)  Assessment & Plan  No discrete signal void within the common bile duct to indicate stone, however heterogeneous decreased T2 signal may indicate sludge or debris.  -LFT's improving  -ERCP on 11/30, showed small stone and biliary sludge  -empiric IV zosyn continues      Transaminitis- (present on admission)  Assessment & Plan  Likely 2/2 CBD stone  -improving, see below    Hyperglycemia- (present on admission)  Assessment & Plan  A1c 5.8, blood sugars improved  -stop accuchecks  -monitor closely    Morbid obesity (HCC)- (present on admission)  Assessment & Plan  15 minutes spent counseling patient on weight loss, nutrition, and healthy lifestyle         VTE prophylaxis: SCDs/TEDs

## 2021-12-01 NOTE — PROGRESS NOTES
Pt is A&O 4  Pain 3/10 pain medication available per MAR   - nausea  Tolerating a CLD diet   Incision -  - Drains  + Voids  + flatus  PTA BM  Up self  SCD's off  Bed alarm off, pt low fall risk per cory cartwright  Reviewed plan of care with patient, bed in lowest position and locked, pt resting comfortably now, call light within reach, all needs met at this time. Interventions will be executed per plan of care

## 2021-12-01 NOTE — DISCHARGE PLANNING
Anticipated Discharge Disposition: Home    Action: Patient discussed in IDT rounds. Per MD patient had ERCP yesterday and plan is for the patient to have a cholecystectomy. There are no known needs from case management at this time.     Barriers to Discharge: medical clearance    Plan: Follow up with medical team.

## 2021-12-01 NOTE — PROGRESS NOTES
"  DATE: 12/1/2021    ACS BLUE SERVICE PROGRESS NOTE    Hospital Day 3 choledocholithiasis - S/P ERCP with stone extraction.    Interval Events:    Patient with minimal pain at this time  Tolerating liquid diet  No fevers or chills.    PHYSICAL EXAMINATION:  Constitutional:     Vital Signs: /73   Pulse 65   Temp 36.7 °C (98 °F) (Temporal)   Resp 18   Ht 1.651 m (5' 5\")   Wt 89.9 kg (198 lb 3.1 oz)   SpO2 96%    General Appearance: The patient is a pleasant  and cooperative man in no critical distress.  HEENT:    The pupils are equal, round, and reactive to light bilaterally. The extraocular muscles are intact bilaterally. The nares and oropharynx are clear. The trachea is midline. No jugulovenous distension.  Respiratory:   Inspection: Unlabored respirations, no intercostal retractions, paradoxical motion, or accessory muscle use.   Palpation:  The chest is nontender.    Auscultation: normal.  Cardiovascular:   Inspection: The skin is warm, dry and well purfused.  Auscultation: Regular rate and rhythm.   Peripheral Pulses: Normal.   Abdomen:   Inspection: Abdominal inspection reveals no abrasions, contusions, lacerations or penetrating wounds.   Palpation: Palpation is remarkable for no significant tenderness, guarding, or peritoneal findings.    Laboratory Values:   Recent Labs     11/29/21  1142 11/30/21 0312 12/01/21  0336   WBC 12.3* 7.8 6.8   RBC 5.44 5.57 4.81   HEMOGLOBIN 16.5 16.7 14.4   HEMATOCRIT 48.4 50.2 42.9   MCV 89.0 90.1 89.2   MCH 30.3 30.0 29.9   MCHC 34.1 33.3* 33.6*   RDW 41.6 42.4 42.7   PLATELETCT 396 321 379   MPV 9.0 9.5 9.3     Recent Labs     11/29/21  0936 11/30/21  0312 12/01/21  0336   SODIUM 136 135 139   POTASSIUM 4.4 4.0 3.9   CHLORIDE 99 100 105   CO2 27 25 27   GLUCOSE 137* 128* 114*   BUN 10 10 11   CREATININE 0.66 0.64 0.72   CALCIUM 9.1 9.2 9.2     Recent Labs     11/28/21  1613 11/28/21  1613 11/29/21  0936 11/29/21  1754 11/30/21  0312 12/01/21  0336   DOLORES 476*  "  < > 265*  --  104* 40   ALTSGPT 329*   < > 363*  --  240* 140*   TBILIRUBIN 5.5*   < > 7.5*  --  7.6* 4.4*   IBILIRUBIN 1.5*  --   --   --   --   --    DBILIRUBIN 4.0*  --   --   --   --   --    ALKPHOSPHAT 307*   < > 319*  --  301* 244*   GLOBULIN 4.0*   < > 3.1  --  3.3 2.5   INR  --   --   --  1.15*  --   --     < > = values in this interval not displayed.     Recent Labs     11/29/21  1754   INR 1.15*        Imaging:   DX-PORTABLE FLUOROSCOPY < 1 HOUR   Final Result      Portable fluoroscopy utilized for 13 seconds.         INTERPRETING LOCATION: [address] [place] [address], [id number]      [id number]   Final Result      1.  Colonic wall thickening with stones and probable sludge consistent with acute cholecystitis.   2.  No discrete signal void within the common bile duct to indicate stone, however heterogeneous decreased T2 signal may indicate sludge or debris.   3.  No significant biliary dilation.      US-RUQ   Final Result      1.  Gallbladder sludge and stones with mild wall thickening and positive sonographic Bazzi's sign is suspicious for acute cholecystitis.   2.  Hepatic steatosis.      DX-CHEST-PORTABLE (1 VIEW)   Final Result      No evidence of acute cardiopulmonary process.          ASSESSMENT AND PLAN:     * Cholecystitis- (present on admission)  Assessment & Plan  US shows Gallbladder sludge and stones with mild wall thickening and positive sonographic Bazzi's sign is suspicious for acute cholecystitis  LFTs elevated   MRCP pending  IV abx    Choledocholithiasis- (present on admission)  Assessment & Plan  Admission T bili 5.5  11/29 T bili 7.5  MRCP:  No discrete signal void within the common bile duct to indicate stone, decreased T2 signal may indicate sludge or debris. No significant biliary dilation.  11/30 ERCP  Shay Carlos M.D. Gastroenterology.         DISPOSITION: Satisfactory post ERCP progress.   Plan to OR tomorrow for laparoscopic cholecystectomy /possible open cholecystectomy  Risk  and benefits of procedure explained in detail and all questions were answered  We will start on scheduled Tylenol and Celebrex tonight  We discussed postoperative pain management including ice as well as narcotics  N.p.o. after midnight except for medication  Continue daily Lovenox       ____________________________________     Javy Azevedo M.D.    DD: 12/1/2021  1:35 PM

## 2021-12-01 NOTE — CARE PLAN
Problem: Pain - Standard  Goal: Alleviation of pain or a reduction in pain to the patient’s comfort goal  Outcome: Progressing     Problem: Knowledge Deficit - Standard  Goal: Patient and family/care givers will demonstrate understanding of plan of care, disease process/condition, diagnostic tests and medications  Outcome: Progressing   The patient is Stable - Low risk of patient condition declining or worsening    Shift Goals  Clinical Goals: pain control, lap shonna  Patient Goals: pain control  Family Goals: No family present at this time    Progress made toward(s) clinical / shift goals:  pain control, possible surgery    Patient is not progressing towards the following goals:

## 2021-12-01 NOTE — PROGRESS NOTES
"Assumed care of patient from night shift Rn.  Patient is alert and oriented times 4, states pain of 5/10, medicated per MAR.  VSS /73   Pulse 65   Temp 36.7 °C (98 °F) (Temporal)   Resp 18   Ht 1.651 m (5' 5\")   Wt 89.9 kg (198 lb 3.1 oz)   SpO2 96%   BMI 32.98 kg/m²   PIV in the LUE, patent and running D5 LR at 75mL/hr.  PIV in the RFA, patent and saline locked.  On RA with saturations in the mid 90s.  Last BM 11/26, urinating without difficulty.  NPO sips with meds, tolerating well.  Slight generalized jaundice noted.  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  POC discussed for the day, bed is locked and in the lowest position, call light is within reach.  All needs are met at this time, hourly rounding is in place.    "

## 2021-12-01 NOTE — PROGRESS NOTES
"Gastroenterology Consult Note:    JAMMIE Durbin  Date & Time note created:    12/1/2021   8:36 AM     Referring MD:  Dr. Maximo Cho    Patient ID:  Name:             Alec Murrell     YOB: 1970  Age:                 51 y.o.  male   MRN:               [id number]                                                             Reason for Consult:      1. Epigastric abdominal pain  2. Elevated LFT's    History of Present Illness:    This is a 51-year-old male, PMH tobacco abuse, asthma who was admitted to the hospital 11/28/2021 with a 1 month history of severe epigastric abdominal pain, nausea, chills.  He was seen in the urgent care x2 as well as his primary care physician's office.  He was prescribed pain medication which was not helpful.  In the ER, he was hypertensive, WBC 12.7, total bilirubin 4.5.  .  .  Alkaline phosphatase 307.  Abdominal ultrasound-gallbladder sludge and stones with mild wall thickening, positive Bazzi sign suspicious for acute cholecystitis.  Hepatic steatosis.  Common bile duct measures 5 mm.  General surgery was consulted who recommended an MRCP.    MRCP 11/29/2021:  Gallbladder wall thickening with stones and probable sludge consistent with acute cholecystitis.  2.  No discrete signal void within the common bile duct to indicate stone, however heterogeneous decreased T2 signal may indicate sludge or debris.     3.  No significant biliary dilation.    Currently, continues to complain of \"severe\" intermittent right upper quadrant/epigastric abdominal pain.  Total bilirubin trending up to 7.5.    INTERVAL HISTORY:    12/1/21: Stable. Abdominal pain is improving. No N/V. TB trending down: Current TB: 4.4 (7.6 on 11/30/21). Plan for lap. Cholecystectomy either today or tomorrow.     ERCP performed by Dr. Cristobal 11/30/21:  Bile duct extraction of small stone and sludge debris. 10 mm sphincterotomy.    Review of Systems:      Review of Systems "   Constitutional: Negative for chills and malaise/fatigue.   HENT: Negative.    Eyes: Negative.    Respiratory: Negative.    Cardiovascular: Negative.    Gastrointestinal: Positive for abdominal pain. Negative for nausea.   Genitourinary: Negative.    Musculoskeletal: Negative.    Skin: Negative.    Neurological: Negative for weakness.   Psychiatric/Behavioral: Negative.              Physical Exam:  Vitals/ General Appearance:   Weight/BMI: Body mass index is 32.98 kg/m².    Vitals:    11/30/21 1540 11/30/21 1936 11/30/21 2332 12/01/21 0357   BP: 152/100 119/68 112/64 106/57   Pulse: (!) 57 60 66 63   Resp: 17 18 18 18   Temp: 37.1 °C (98.7 °F) 36.2 °C (97.2 °F) 36.4 °C (97.5 °F) 36.2 °C (97.2 °F)   TempSrc: Temporal Temporal Temporal Temporal   SpO2: 93% 93% 92% 94%   Weight:       Height:         Oxygen Therapy:  Pulse Oximetry: 94 %, O2 (LPM): 0, O2 Delivery Device: None - Room Air    Physical Exam  Vitals and nursing note reviewed.   Constitutional:       Appearance: Normal appearance. He is obese.   HENT:      Head: Normocephalic and atraumatic.      Right Ear: Tympanic membrane normal.      Left Ear: Tympanic membrane normal.      Nose: Nose normal.      Mouth/Throat:      Mouth: Mucous membranes are moist.   Eyes:      General: Scleral icterus present.      Extraocular Movements: Extraocular movements intact.      Pupils: Pupils are equal, round, and reactive to light.   Cardiovascular:      Rate and Rhythm: Normal rate and regular rhythm.      Pulses: Normal pulses.      Heart sounds: Normal heart sounds.   Pulmonary:      Breath sounds: No wheezing.   Abdominal:      General: Bowel sounds are normal.      Palpations: Abdomen is soft.      Tenderness: There is abdominal tenderness. There is no guarding.   Musculoskeletal:         General: Normal range of motion.      Cervical back: Normal range of motion and neck supple.   Skin:     General: Skin is warm and dry.   Neurological:      General: No focal  deficit present.      Mental Status: He is alert and oriented to person, place, and time.   Psychiatric:         Mood and Affect: Mood normal.         Behavior: Behavior normal.         Thought Content: Thought content normal.         Judgment: Judgment normal.         Past Medical History:   No past medical history on file.    Past Surgical History:  Past Surgical History:   Procedure Laterality Date   • PB ERCP,DIAGNOSTIC Left 11/30/2021    Procedure: ERCP (ENDOSCOPIC RETROGRADE CHOLANGIOPANCREATOGRAPHY);  Surgeon: Gino Cristobal M.D.;  Location: SURGERY SAME DAY [facility];  Service: Gastroenterology       Hospital Medications:    Current Facility-Administered Medications:   •  albuterol inhaler 1 Puff, 1 Puff, Inhalation, Q8HRS (RT), Bruno Beltrán M.D., 1 Puff at 12/01/21 0700  •  albuterol inhaler 2 Puff, 2 Puff, Inhalation, Q4H PRN (RT), Bruno Beltrán M.D.  •  D5LR infusion, , Intravenous, Continuous, Bruno Beltrán M.D., Last Rate: 75 mL/hr at 11/30/21 0935, Rate Change at 11/30/21 0935  •  [Held by provider] heparin injection 5,000 Units, 5,000 Units, Subcutaneous, Q8HRS, Marcel Gonzalez M.D., 5,000 Units at 11/29/21 0622  •  acetaminophen (Tylenol) tablet 650 mg, 650 mg, Oral, Q6HRS PRN, Marcel Gonzalez M.D.  •  HYDROmorphone (Dilaudid) injection 1 mg, 1 mg, Intravenous, Q4HRS PRN, Marcel Gonzalez M.D., 1 mg at 11/30/21 1502  •  oxyCODONE-acetaminophen (PERCOCET) 5-325 MG per tablet 1 Tablet, 1 Tablet, Oral, Q4HRS PRN, Marcel Gonzalez M.D., 1 Tablet at 11/30/21 1502  •  [DISCONTINUED] piperacillin-tazobactam (ZOSYN) 4.5 g in  mL IVPB, 4.5 g, Intravenous, Once **AND** piperacillin-tazobactam (ZOSYN) 4.5 g in  mL IVPB, 4.5 g, Intravenous, Q8HRS, Marcel Gonazlez M.D., Last Rate: 25 mL/hr at 12/01/21 0540, 4.5 g at 12/01/21 0540    Current Outpatient Medications:  Current Facility-Administered Medications   Medication Dose Route Frequency Provider Last Rate Last Admin    • albuterol inhaler 1 Puff  1 Puff Inhalation Q8HRS (RT) Bruno Beltrán M.D.   1 Puff at 12/01/21 0700   • albuterol inhaler 2 Puff  2 Puff Inhalation Q4H PRN (RT) Bruno Beltrán M.D.       • D5LR infusion   Intravenous Continuous Bruno Beltrán M.D. 75 mL/hr at 11/30/21 0935 Rate Change at 11/30/21 0935   • [Held by provider] heparin injection 5,000 Units  5,000 Units Subcutaneous Q8HRS Marcel Gonzalez M.D.   5,000 Units at 11/29/21 0622   • acetaminophen (Tylenol) tablet 650 mg  650 mg Oral Q6HRS PRN Marcel Gonzalez M.D.       • HYDROmorphone (Dilaudid) injection 1 mg  1 mg Intravenous Q4HRS PRN Marcel Gonzalez M.D.   1 mg at 11/30/21 1502   • oxyCODONE-acetaminophen (PERCOCET) 5-325 MG per tablet 1 Tablet  1 Tablet Oral Q4HRS PRN Marcel Gonzalez M.D.   1 Tablet at 11/30/21 1502   • piperacillin-tazobactam (ZOSYN) 4.5 g in  mL IVPB  4.5 g Intravenous Q8HRS Marcel Gonzalez M.D. 25 mL/hr at 12/01/21 0540 4.5 g at 12/01/21 0540       Medication Allergy:  No Known Allergies    Family History:  No family history on file.    Social History:  Social History     Socioeconomic History   • Marital status:      Spouse name: Not on file   • Number of children: Not on file   • Years of education: Not on file   • Highest education level: Not on file   Occupational History   • Not on file   Tobacco Use   • Smoking status: Current Every Day Smoker     Packs/day: 0.25     Types: Cigarettes   • Smokeless tobacco: Not on file   Substance and Sexual Activity   • Alcohol use: Never   • Drug use: Never   • Sexual activity: Not on file   Other Topics Concern   • Not on file   Social History Narrative   • Not on file     Social Determinants of Health     Financial Resource Strain:    • Difficulty of Paying Living Expenses: Not on file   Food Insecurity:    • Worried About Running Out of Food in the Last Year: Not on file   • Ran Out of Food in the Last Year: Not on file   Transportation Needs:    •  Lack of Transportation (Medical): Not on file   • Lack of Transportation (Non-Medical): Not on file   Physical Activity:    • Days of Exercise per Week: Not on file   • Minutes of Exercise per Session: Not on file   Stress:    • Feeling of Stress : Not on file   Social Connections:    • Frequency of Communication with Friends and Family: Not on file   • Frequency of Social Gatherings with Friends and Family: Not on file   • Attends Hinduism Services: Not on file   • Active Member of Clubs or Organizations: Not on file   • Attends Club or Organization Meetings: Not on file   • Marital Status: Not on file   Intimate Partner Violence:    • Fear of Current or Ex-Partner: Not on file   • Emotionally Abused: Not on file   • Physically Abused: Not on file   • Sexually Abused: Not on file   Housing Stability:    • Unable to Pay for Housing in the Last Year: Not on file   • Number of Places Lived in the Last Year: Not on file   • Unstable Housing in the Last Year: Not on file         MDM (Data Review):     Records reviewed and summarized in current documentation    Lab Data Review:  Recent Results (from the past 24 hour(s))   CBC WITH DIFFERENTIAL    Collection Time: 12/01/21  3:36 AM   Result Value Ref Range    WBC 6.8 4.8 - 10.8 K/uL    RBC 4.81 4.70 - 6.10 M/uL    Hemoglobin 14.4 14.0 - 18.0 g/dL    Hematocrit 42.9 42.0 - 52.0 %    MCV 89.2 81.4 - 97.8 fL    MCH 29.9 27.0 - 33.0 pg    MCHC 33.6 (L) 33.7 - 35.3 g/dL    RDW 42.7 35.9 - 50.0 fL    Platelet Count 379 164 - 446 K/uL    MPV 9.3 9.0 - 12.9 fL    Neutrophils-Polys 62.20 44.00 - 72.00 %    Lymphocytes 28.90 22.00 - 41.00 %    Monocytes 7.60 0.00 - 13.40 %    Eosinophils 0.90 0.00 - 6.90 %    Basophils 0.30 0.00 - 1.80 %    Immature Granulocytes 0.10 0.00 - 0.90 %    Nucleated RBC 0.00 /100 WBC    Neutrophils (Absolute) 4.24 1.82 - 7.42 K/uL    Lymphs (Absolute) 1.97 1.00 - 4.80 K/uL    Monos (Absolute) 0.52 0.00 - 0.85 K/uL    Eos (Absolute) 0.06 0.00 - 0.51 K/uL     Baso (Absolute) 0.02 0.00 - 0.12 K/uL    Immature Granulocytes (abs) 0.01 0.00 - 0.11 K/uL    NRBC (Absolute) 0.00 K/uL   Comp Metabolic Panel    Collection Time: 12/01/21  3:36 AM   Result Value Ref Range    Sodium 139 135 - 145 mmol/L    Potassium 3.9 3.6 - 5.5 mmol/L    Chloride 105 96 - 112 mmol/L    Co2 27 20 - 33 mmol/L    Anion Gap 7.0 7.0 - 16.0    Glucose 114 (H) 65 - 99 mg/dL    Bun 11 8 - 22 mg/dL    Creatinine 0.72 0.50 - 1.40 mg/dL    Calcium 9.2 8.5 - 10.5 mg/dL    AST(SGOT) 40 12 - 45 U/L    ALT(SGPT) 140 (H) 2 - 50 U/L    Alkaline Phosphatase 244 (H) 30 - 99 U/L    Total Bilirubin 4.4 (H) 0.1 - 1.5 mg/dL    Albumin 3.5 3.2 - 4.9 g/dL    Total Protein 6.0 6.0 - 8.2 g/dL    Globulin 2.5 1.9 - 3.5 g/dL    A-G Ratio 1.4 g/dL   LIPASE    Collection Time: 12/01/21  3:36 AM   Result Value Ref Range    Lipase 93 (H) 11 - 82 U/L   ESTIMATED GFR    Collection Time: 12/01/21  3:36 AM   Result Value Ref Range    GFR If African American >60 >60 mL/min/1.73 m 2    GFR If Non African American >60 >60 mL/min/1.73 m 2       Imaging/Procedures Review:      DX-PORTABLE FLUOROSCOPY < 1 HOUR   Final Result      Portable fluoroscopy utilized for 13 seconds.         INTERPRETING LOCATION: [address] [place] [address], [id number]      [id number]   Final Result      1.  Colonic wall thickening with stones and probable sludge consistent with acute cholecystitis.   2.  No discrete signal void within the common bile duct to indicate stone, however heterogeneous decreased T2 signal may indicate sludge or debris.   3.  No significant biliary dilation.      US-RUQ   Final Result      1.  Gallbladder sludge and stones with mild wall thickening and positive sonographic Bazzi's sign is suspicious for acute cholecystitis.   2.  Hepatic steatosis.      DX-CHEST-PORTABLE (1 VIEW)   Final Result      No evidence of acute cardiopulmonary process.           MDM (Assessment and Plan):     Patient Active Problem List    Diagnosis Date Noted   •  Acute respiratory failure with hypoxia (HCC) 11/30/2021   • Mild intermittent asthma with exacerbation 11/30/2021   • Choledocholithiasis 11/29/2021   • Hepatic steatosis 11/29/2021   • Cholecystitis 11/28/2021   • Morbid obesity (HCC) 11/28/2021   • Hyperglycemia 11/28/2021   • Transaminitis 11/28/2021     This is a pleasant 51-year-old male, who was admitted to the hospital 11/28/2021 with a 1 month history of severe intermittent epigastric/right upper quadrant pain, nausea, chills.  He was evaluated at the urgent care x2-prescribed pain medication which was not helpful.  In the emergency room, he was hypertensive, WBC 12.7, total bilirubin 4.5.  .  .  Alkaline phosphatase 307.  Abdominal ultrasound-gallbladder sludge and stones with mild wall thickening, positive Bazzi sign suspicious for acute cholecystitis.  Hepatic steatosis.  Common bile duct measures 5 mm.  General surgery was consulted who recommended an MRCP.  Gallbladder wall thickening with stones and probable sludge consistent with acute cholecystitis.  No discrete signal void within the common bile duct to indicate stone however heterogenous decreased T2 signal may indicate sludge or debris.  No significant biliary dilatation.  His total bilirubin continues to rise.  Current bilirubin 7.5.  He is maintained on Zosyn IV.    ASSESSMENT:  1. Abdominal pain: Improved.  2.  Elevated LFTs-total bilirubin trending up-concern for choledocholithiasis. ERCP small stone extraction and debris with 10 mm sphincterotomy.  3.  Cholecystitis  4.  Hypertension: Resolved  5.  Wheezing-chest x-ray unremarkable for infiltrates or effusions.    PLAN:  1.  Lap. Cholecystectomy at optimal timing    GI WILL SIGN OFF. PLEASE CALL IF ANY QUESTIONS OR CONCERNS    Thank your for the opportunity to assist in the care of your patient.  Please call for any questions or concerns.    GAVINO Durbin.

## 2021-12-02 ENCOUNTER — ANESTHESIA EVENT (OUTPATIENT)
Dept: SURGERY | Facility: MEDICAL CENTER | Age: 51
DRG: 417 | End: 2021-12-02
Payer: COMMERCIAL

## 2021-12-02 ENCOUNTER — ANESTHESIA (OUTPATIENT)
Dept: SURGERY | Facility: MEDICAL CENTER | Age: 51
DRG: 417 | End: 2021-12-02
Payer: COMMERCIAL

## 2021-12-02 LAB
ALBUMIN SERPL BCP-MCNC: 3.1 G/DL (ref 3.2–4.9)
ALBUMIN/GLOB SERPL: 0.9 G/DL
ALP SERPL-CCNC: 282 U/L (ref 30–99)
ALT SERPL-CCNC: 105 U/L (ref 2–50)
ANION GAP SERPL CALC-SCNC: 10 MMOL/L (ref 7–16)
AST SERPL-CCNC: 31 U/L (ref 12–45)
BASOPHILS # BLD AUTO: 0.6 % (ref 0–1.8)
BASOPHILS # BLD: 0.03 K/UL (ref 0–0.12)
BILIRUB SERPL-MCNC: 2.6 MG/DL (ref 0.1–1.5)
BUN SERPL-MCNC: 9 MG/DL (ref 8–22)
CALCIUM SERPL-MCNC: 9 MG/DL (ref 8.5–10.5)
CHLORIDE SERPL-SCNC: 109 MMOL/L (ref 96–112)
CO2 SERPL-SCNC: 22 MMOL/L (ref 20–33)
CREAT SERPL-MCNC: 0.83 MG/DL (ref 0.5–1.4)
EOSINOPHIL # BLD AUTO: 0.12 K/UL (ref 0–0.51)
EOSINOPHIL NFR BLD: 2.3 % (ref 0–6.9)
ERYTHROCYTE [DISTWIDTH] IN BLOOD BY AUTOMATED COUNT: 44 FL (ref 35.9–50)
GLOBULIN SER CALC-MCNC: 3.3 G/DL (ref 1.9–3.5)
GLUCOSE SERPL-MCNC: 108 MG/DL (ref 65–99)
HCT VFR BLD AUTO: 42.8 % (ref 42–52)
HGB BLD-MCNC: 14.6 G/DL (ref 14–18)
IMM GRANULOCYTES # BLD AUTO: 0.02 K/UL (ref 0–0.11)
IMM GRANULOCYTES NFR BLD AUTO: 0.4 % (ref 0–0.9)
LIPASE SERPL-CCNC: 96 U/L (ref 11–82)
LYMPHOCYTES # BLD AUTO: 2.27 K/UL (ref 1–4.8)
LYMPHOCYTES NFR BLD: 44.2 % (ref 22–41)
MCH RBC QN AUTO: 30.5 PG (ref 27–33)
MCHC RBC AUTO-ENTMCNC: 34.1 G/DL (ref 33.7–35.3)
MCV RBC AUTO: 89.4 FL (ref 81.4–97.8)
MONOCYTES # BLD AUTO: 0.39 K/UL (ref 0–0.85)
MONOCYTES NFR BLD AUTO: 7.6 % (ref 0–13.4)
NEUTROPHILS # BLD AUTO: 2.31 K/UL (ref 1.82–7.42)
NEUTROPHILS NFR BLD: 44.9 % (ref 44–72)
NRBC # BLD AUTO: 0 K/UL
NRBC BLD-RTO: 0 /100 WBC
PATHOLOGY CONSULT NOTE: NORMAL
PLATELET # BLD AUTO: 382 K/UL (ref 164–446)
PMV BLD AUTO: 9.4 FL (ref 9–12.9)
POTASSIUM SERPL-SCNC: 3.7 MMOL/L (ref 3.6–5.5)
PROT SERPL-MCNC: 6.4 G/DL (ref 6–8.2)
RBC # BLD AUTO: 4.79 M/UL (ref 4.7–6.1)
SODIUM SERPL-SCNC: 141 MMOL/L (ref 135–145)
WBC # BLD AUTO: 5.1 K/UL (ref 4.8–10.8)

## 2021-12-02 PROCEDURE — 47562 LAPAROSCOPIC CHOLECYSTECTOMY: CPT | Performed by: SURGERY

## 2021-12-02 PROCEDURE — 700111 HCHG RX REV CODE 636 W/ 250 OVERRIDE (IP): Performed by: ANESTHESIOLOGY

## 2021-12-02 PROCEDURE — 99232 SBSQ HOSP IP/OBS MODERATE 35: CPT | Performed by: INTERNAL MEDICINE

## 2021-12-02 PROCEDURE — A9270 NON-COVERED ITEM OR SERVICE: HCPCS | Performed by: SURGERY

## 2021-12-02 PROCEDURE — 85025 COMPLETE CBC W/AUTO DIFF WBC: CPT

## 2021-12-02 PROCEDURE — 500800 HCHG LAPAROSCOPIC J/L HOOK: Performed by: SURGERY

## 2021-12-02 PROCEDURE — 160009 HCHG ANES TIME/MIN: Performed by: SURGERY

## 2021-12-02 PROCEDURE — 160035 HCHG PACU - 1ST 60 MINS PHASE I: Performed by: SURGERY

## 2021-12-02 PROCEDURE — 160029 HCHG SURGERY MINUTES - 1ST 30 MINS LEVEL 4: Performed by: SURGERY

## 2021-12-02 PROCEDURE — 700101 HCHG RX REV CODE 250: Performed by: SURGERY

## 2021-12-02 PROCEDURE — 700102 HCHG RX REV CODE 250 W/ 637 OVERRIDE(OP): Performed by: SURGERY

## 2021-12-02 PROCEDURE — 770006 HCHG ROOM/CARE - MED/SURG/GYN SEMI*

## 2021-12-02 PROCEDURE — 502240 HCHG MISC OR SUPPLY RC 0272: Performed by: SURGERY

## 2021-12-02 PROCEDURE — 0FT44ZZ RESECTION OF GALLBLADDER, PERCUTANEOUS ENDOSCOPIC APPROACH: ICD-10-PCS | Performed by: SURGERY

## 2021-12-02 PROCEDURE — 80053 COMPREHEN METABOLIC PANEL: CPT

## 2021-12-02 PROCEDURE — 700101 HCHG RX REV CODE 250: Performed by: ANESTHESIOLOGY

## 2021-12-02 PROCEDURE — 501399 HCHG SPECIMAN BAG, ENDO CATC: Performed by: SURGERY

## 2021-12-02 PROCEDURE — 501583 HCHG TROCAR, THRD CAN&SEAL 5X100: Performed by: SURGERY

## 2021-12-02 PROCEDURE — 83690 ASSAY OF LIPASE: CPT

## 2021-12-02 PROCEDURE — 700111 HCHG RX REV CODE 636 W/ 250 OVERRIDE (IP): Performed by: STUDENT IN AN ORGANIZED HEALTH CARE EDUCATION/TRAINING PROGRAM

## 2021-12-02 PROCEDURE — 502571 HCHG PACK, LAP CHOLE: Performed by: SURGERY

## 2021-12-02 PROCEDURE — 500516 HCHG ENDOLOOP II 0 VIOLET 18: Performed by: SURGERY

## 2021-12-02 PROCEDURE — 160041 HCHG SURGERY MINUTES - EA ADDL 1 MIN LEVEL 4: Performed by: SURGERY

## 2021-12-02 PROCEDURE — 88304 TISSUE EXAM BY PATHOLOGIST: CPT

## 2021-12-02 PROCEDURE — 700102 HCHG RX REV CODE 250 W/ 637 OVERRIDE(OP): Performed by: ANESTHESIOLOGY

## 2021-12-02 PROCEDURE — 501582 HCHG TROCAR, THRD BLADED: Performed by: SURGERY

## 2021-12-02 PROCEDURE — 700105 HCHG RX REV CODE 258: Performed by: ANESTHESIOLOGY

## 2021-12-02 PROCEDURE — 700105 HCHG RX REV CODE 258: Performed by: STUDENT IN AN ORGANIZED HEALTH CARE EDUCATION/TRAINING PROGRAM

## 2021-12-02 PROCEDURE — 160048 HCHG OR STATISTICAL LEVEL 1-5: Performed by: SURGERY

## 2021-12-02 PROCEDURE — 160002 HCHG RECOVERY MINUTES (STAT): Performed by: SURGERY

## 2021-12-02 PROCEDURE — A9270 NON-COVERED ITEM OR SERVICE: HCPCS | Performed by: ANESTHESIOLOGY

## 2021-12-02 PROCEDURE — 501838 HCHG SUTURE GENERAL: Performed by: SURGERY

## 2021-12-02 PROCEDURE — 501572 HCHG TROCAR, SHIELD OBTU 5X100: Performed by: SURGERY

## 2021-12-02 RX ORDER — ONDANSETRON 2 MG/ML
INJECTION INTRAMUSCULAR; INTRAVENOUS PRN
Status: DISCONTINUED | OUTPATIENT
Start: 2021-12-02 | End: 2021-12-02 | Stop reason: SURG

## 2021-12-02 RX ORDER — LIDOCAINE HYDROCHLORIDE 20 MG/ML
INJECTION, SOLUTION EPIDURAL; INFILTRATION; INTRACAUDAL; PERINEURAL PRN
Status: DISCONTINUED | OUTPATIENT
Start: 2021-12-02 | End: 2021-12-02 | Stop reason: SURG

## 2021-12-02 RX ORDER — MIDAZOLAM HYDROCHLORIDE 1 MG/ML
1 INJECTION INTRAMUSCULAR; INTRAVENOUS
Status: DISCONTINUED | OUTPATIENT
Start: 2021-12-02 | End: 2021-12-02 | Stop reason: HOSPADM

## 2021-12-02 RX ORDER — BUPIVACAINE HYDROCHLORIDE AND EPINEPHRINE 5; 5 MG/ML; UG/ML
INJECTION, SOLUTION EPIDURAL; INTRACAUDAL; PERINEURAL
Status: DISCONTINUED | OUTPATIENT
Start: 2021-12-02 | End: 2021-12-02 | Stop reason: HOSPADM

## 2021-12-02 RX ORDER — SODIUM CHLORIDE, SODIUM LACTATE, POTASSIUM CHLORIDE, CALCIUM CHLORIDE 600; 310; 30; 20 MG/100ML; MG/100ML; MG/100ML; MG/100ML
INJECTION, SOLUTION INTRAVENOUS
Status: DISCONTINUED | OUTPATIENT
Start: 2021-12-02 | End: 2021-12-02 | Stop reason: SURG

## 2021-12-02 RX ORDER — DEXAMETHASONE SODIUM PHOSPHATE 4 MG/ML
INJECTION, SOLUTION INTRA-ARTICULAR; INTRALESIONAL; INTRAMUSCULAR; INTRAVENOUS; SOFT TISSUE PRN
Status: DISCONTINUED | OUTPATIENT
Start: 2021-12-02 | End: 2021-12-02 | Stop reason: SURG

## 2021-12-02 RX ORDER — HYDROMORPHONE HYDROCHLORIDE 2 MG/ML
INJECTION, SOLUTION INTRAMUSCULAR; INTRAVENOUS; SUBCUTANEOUS PRN
Status: DISCONTINUED | OUTPATIENT
Start: 2021-12-02 | End: 2021-12-02 | Stop reason: SURG

## 2021-12-02 RX ORDER — DIPHENHYDRAMINE HYDROCHLORIDE 50 MG/ML
12.5 INJECTION INTRAMUSCULAR; INTRAVENOUS
Status: DISCONTINUED | OUTPATIENT
Start: 2021-12-02 | End: 2021-12-02 | Stop reason: HOSPADM

## 2021-12-02 RX ORDER — MAGNESIUM HYDROXIDE 1200 MG/15ML
LIQUID ORAL
Status: COMPLETED | OUTPATIENT
Start: 2021-12-02 | End: 2021-12-02

## 2021-12-02 RX ORDER — SODIUM CHLORIDE, SODIUM LACTATE, POTASSIUM CHLORIDE, CALCIUM CHLORIDE 600; 310; 30; 20 MG/100ML; MG/100ML; MG/100ML; MG/100ML
INJECTION, SOLUTION INTRAVENOUS CONTINUOUS
Status: DISCONTINUED | OUTPATIENT
Start: 2021-12-02 | End: 2021-12-02 | Stop reason: HOSPADM

## 2021-12-02 RX ORDER — ROCURONIUM BROMIDE 10 MG/ML
INJECTION, SOLUTION INTRAVENOUS PRN
Status: DISCONTINUED | OUTPATIENT
Start: 2021-12-02 | End: 2021-12-02 | Stop reason: SURG

## 2021-12-02 RX ORDER — HALOPERIDOL 5 MG/ML
1 INJECTION INTRAMUSCULAR
Status: DISCONTINUED | OUTPATIENT
Start: 2021-12-02 | End: 2021-12-02 | Stop reason: HOSPADM

## 2021-12-02 RX ORDER — CEFAZOLIN SODIUM 1 G/3ML
INJECTION, POWDER, FOR SOLUTION INTRAMUSCULAR; INTRAVENOUS PRN
Status: DISCONTINUED | OUTPATIENT
Start: 2021-12-02 | End: 2021-12-02 | Stop reason: SURG

## 2021-12-02 RX ORDER — MIDAZOLAM HYDROCHLORIDE 1 MG/ML
INJECTION INTRAMUSCULAR; INTRAVENOUS PRN
Status: DISCONTINUED | OUTPATIENT
Start: 2021-12-02 | End: 2021-12-02 | Stop reason: SURG

## 2021-12-02 RX ORDER — LABETALOL HYDROCHLORIDE 5 MG/ML
INJECTION, SOLUTION INTRAVENOUS PRN
Status: DISCONTINUED | OUTPATIENT
Start: 2021-12-02 | End: 2021-12-02 | Stop reason: SURG

## 2021-12-02 RX ADMIN — CELECOXIB 200 MG: 200 CAPSULE ORAL at 17:16

## 2021-12-02 RX ADMIN — ROCURONIUM BROMIDE 10 MG: 10 INJECTION, SOLUTION INTRAVENOUS at 14:06

## 2021-12-02 RX ADMIN — HYDROMORPHONE HYDROCHLORIDE 0.5 MG: 2 INJECTION, SOLUTION INTRAMUSCULAR; INTRAVENOUS; SUBCUTANEOUS at 15:01

## 2021-12-02 RX ADMIN — FENTANYL CITRATE 50 MCG: 50 INJECTION, SOLUTION INTRAMUSCULAR; INTRAVENOUS at 13:15

## 2021-12-02 RX ADMIN — MIDAZOLAM HYDROCHLORIDE 2 MG: 1 INJECTION, SOLUTION INTRAMUSCULAR; INTRAVENOUS at 12:58

## 2021-12-02 RX ADMIN — ROCURONIUM BROMIDE 50 MG: 10 INJECTION, SOLUTION INTRAVENOUS at 12:58

## 2021-12-02 RX ADMIN — HYDROMORPHONE HYDROCHLORIDE 0.5 MG: 2 INJECTION, SOLUTION INTRAMUSCULAR; INTRAVENOUS; SUBCUTANEOUS at 14:50

## 2021-12-02 RX ADMIN — CELECOXIB 200 MG: 200 CAPSULE ORAL at 04:07

## 2021-12-02 RX ADMIN — HYDROMORPHONE HYDROCHLORIDE 0.5 MG: 2 INJECTION, SOLUTION INTRAMUSCULAR; INTRAVENOUS; SUBCUTANEOUS at 14:45

## 2021-12-02 RX ADMIN — CEFAZOLIN 2 G: 330 INJECTION, POWDER, FOR SOLUTION INTRAMUSCULAR; INTRAVENOUS at 12:58

## 2021-12-02 RX ADMIN — OXYCODONE 5 MG: 5 TABLET ORAL at 17:16

## 2021-12-02 RX ADMIN — SUGAMMADEX 200 MG: 100 INJECTION, SOLUTION INTRAVENOUS at 14:54

## 2021-12-02 RX ADMIN — FENTANYL CITRATE 50 MCG: 50 INJECTION, SOLUTION INTRAMUSCULAR; INTRAVENOUS at 12:58

## 2021-12-02 RX ADMIN — FENTANYL CITRATE 50 MCG: 50 INJECTION, SOLUTION INTRAMUSCULAR; INTRAVENOUS at 14:03

## 2021-12-02 RX ADMIN — FENTANYL CITRATE 50 MCG: 50 INJECTION, SOLUTION INTRAMUSCULAR; INTRAVENOUS at 13:33

## 2021-12-02 RX ADMIN — PIPERACILLIN AND TAZOBACTAM 4.5 G: 4; .5 INJECTION, POWDER, LYOPHILIZED, FOR SOLUTION INTRAVENOUS; PARENTERAL at 04:07

## 2021-12-02 RX ADMIN — PIPERACILLIN AND TAZOBACTAM 4.5 G: 4; .5 INJECTION, POWDER, LYOPHILIZED, FOR SOLUTION INTRAVENOUS; PARENTERAL at 20:45

## 2021-12-02 RX ADMIN — ONDANSETRON 4 MG: 2 INJECTION INTRAMUSCULAR; INTRAVENOUS at 12:58

## 2021-12-02 RX ADMIN — FENTANYL CITRATE 50 MCG: 50 INJECTION, SOLUTION INTRAMUSCULAR; INTRAVENOUS at 15:15

## 2021-12-02 RX ADMIN — LIDOCAINE HYDROCHLORIDE 100 MG: 20 INJECTION, SOLUTION EPIDURAL; INFILTRATION; INTRACAUDAL at 12:58

## 2021-12-02 RX ADMIN — LABETALOL HYDROCHLORIDE 10 MG: 5 INJECTION, SOLUTION INTRAVENOUS at 14:29

## 2021-12-02 RX ADMIN — ACETAMINOPHEN 1000 MG: 500 TABLET ORAL at 17:16

## 2021-12-02 RX ADMIN — HYDROCODONE BITARTRATE AND ACETAMINOPHEN 15 MG: 7.5; 325 SOLUTION ORAL at 15:15

## 2021-12-02 RX ADMIN — SODIUM CHLORIDE, POTASSIUM CHLORIDE, SODIUM LACTATE AND CALCIUM CHLORIDE: 600; 310; 30; 20 INJECTION, SOLUTION INTRAVENOUS at 12:53

## 2021-12-02 RX ADMIN — PROPOFOL 150 MG: 10 INJECTION, EMULSION INTRAVENOUS at 12:58

## 2021-12-02 RX ADMIN — ROCURONIUM BROMIDE 10 MG: 10 INJECTION, SOLUTION INTRAVENOUS at 14:20

## 2021-12-02 RX ADMIN — FENTANYL CITRATE 50 MCG: 50 INJECTION, SOLUTION INTRAMUSCULAR; INTRAVENOUS at 14:01

## 2021-12-02 RX ADMIN — ACETAMINOPHEN 1000 MG: 500 TABLET ORAL at 04:07

## 2021-12-02 RX ADMIN — DEXAMETHASONE SODIUM PHOSPHATE 4 MG: 4 INJECTION, SOLUTION INTRA-ARTICULAR; INTRALESIONAL; INTRAMUSCULAR; INTRAVENOUS; SOFT TISSUE at 12:58

## 2021-12-02 ASSESSMENT — ENCOUNTER SYMPTOMS
BLURRED VISION: 0
HEADACHES: 0
VOMITING: 0
NAUSEA: 0
HEARTBURN: 0
HEMOPTYSIS: 0
CLAUDICATION: 0
ABDOMINAL PAIN: 1
WHEEZING: 0
SHORTNESS OF BREATH: 0
PALPITATIONS: 0
DEPRESSION: 0
SPUTUM PRODUCTION: 0
NERVOUS/ANXIOUS: 0
FOCAL WEAKNESS: 0
SORE THROAT: 0
FEVER: 0
COUGH: 0
DOUBLE VISION: 0
ORTHOPNEA: 0

## 2021-12-02 ASSESSMENT — COGNITIVE AND FUNCTIONAL STATUS - GENERAL
TOILETING: A LITTLE
CLIMB 3 TO 5 STEPS WITH RAILING: A LITTLE
DAILY ACTIVITIY SCORE: 23
WALKING IN HOSPITAL ROOM: A LITTLE
STANDING UP FROM CHAIR USING ARMS: A LITTLE
STANDING UP FROM CHAIR USING ARMS: A LITTLE
MOVING TO AND FROM BED TO CHAIR: A LITTLE
SUGGESTED CMS G CODE MODIFIER DAILY ACTIVITY: CI

## 2021-12-02 ASSESSMENT — PAIN DESCRIPTION - PAIN TYPE
TYPE: SURGICAL PAIN
TYPE: ACUTE PAIN
TYPE: SURGICAL PAIN
TYPE: ACUTE PAIN
TYPE: SURGICAL PAIN
TYPE: SURGICAL PAIN
TYPE: ACUTE PAIN

## 2021-12-02 NOTE — OP REPORT
DATE OF OPERATION: 12/2/2021    PREOPERATIVE DIAGNOSIS: right upper quadrant pain, cholelithiasis, acute cholecystitis and choledocholithiasis.    POSTOPERATIVE DIAGNOSIS: right upper quadrant pain, gallbladder sludge, acute cholecystitis and choledocholithiasis.    PROCEDURE PERFORMED: laparoscopic cholecystectomy    SURGEON: Javy Azevedo M.D.    ANESTHESIOLOGIST: Terrence Joyce M.D.    ANESTHESIA: General endotracheal anesthesia.    ASA CLASSIFICATION: II. Emergent.    INDICATIONS: The patient is a 51 year-old man with clinical and radiographic findings of right upper quadrant pain, cholelithiasis, acute cholecystitis and choledocholithiasis - S/P ERCP with stone extraction.  He is taken to the operating room for planned laparoscopic cholecystectomy     FINDINGS: Gallbladder wall thickening and acute inflammation. Fatty appearing liver. Tense gallbladder - aspirated.    WOUND CLASSIFICATION: Class III, Contaminated.    SPECIMEN: Gallbladder.    ESTIMATED BLOOD LOSS: 50 mL.    PROCEDURE: Following informed consent consent, the patient was properly identified, taken to the operating room and placed in supine position where general endotracheal anesthesia was administered. Intravenous antibiotics were administered by the anesthesiologist in correct time interval. The patient voided prior to surgery. A urinary catheter was not placed. Sequential compression devices were employed. The abdomen was prepped and draped into a sterile field.     Marcaine 0.5% was used to infiltrate the port sites. A 5 mm supraumbilical midline incision was made and the subcutaneous tissues spread bluntly.  The fascia was incised and a 5 mm port without a trocar was inserted.  Carbon dioxide pneumoperitoneum was instilled.  A 5 mm 30 degree lens and camera was passed into the peritoneal cavity. An 11 mm port was placed in the epigastric midline under direct vision. Two 5 mm right subcostal ports were placed under direct  vision.     The gallbladder was identified and elevated. Dissection was carried out to completely expose and delineate the hepatocystic triangle. The critical view was achieved definitively identifying the single cystic duct and single cystic artery entering the gallbladder. These structures were multiply clipped proximally, once distally and divided. The gallbladder was dissected free from the undersurface of the liver using electrocautery and placed within a laparoscopic specimen retrieval bag. The gallbladder was delivered intact from the abdominal cavity and submitted for pathology.  The gallbladder fossa was irrigated. All excess irrigant was evacuated from the abdominal cavity.  The gallbladder fossa was inspected. Hemostasis was satisfactory. The gallbladder fossa was inspected. Hemostasis was controlled with HEMOBLAST™ Jaye hemostatic powder agent.    The epigastric port site fascia was approximated using a trocar site closure device with a 0 VICRYL® Plus Antibacterial suture.    The patient tolerated the procedure well, and there were no apparent complications. All sponge, needle, and instrument counts were correct on 2 separate occasions. The patient was awakened, extubated, and transferred to  to the post anesthesia care unit (PACU) in satisfactory condition.       ____________________________________     Javy Azevedo M.D.    DD: 12/2/2021  3:17 PM

## 2021-12-02 NOTE — OR SURGEON
Immediate Post OP Note    PreOp Diagnosis: Acute cholecystitis / choledocholithiasis S/P ERCP      PostOp Diagnosis: same      Procedure(s):  CHOLECYSTECTOMY, LAPAROSCOPIC - Wound Class: Contaminated    Surgeon(s):  Javy Azevedo M.D.    Anesthesiologist/Type of Anesthesia:  Anesthesiologist: Terrence Joyce M.D./General    Surgical Staff:  Circulator: Judie Rain R.N.; Sandee Herzog R.N.  Relief Scrub: Rebecca Phipps  Scrub Person: Matthew May    Specimens removed if any:  ID Type Source Tests Collected by Time Destination   A :  Tissue Gallbladder PATHOLOGY SPECIMEN Javy Azevedo M.D. 12/2/2021  1:26 PM        Estimated Blood Loss: 50 cc    Findings: tense gallbladder aspirated, marked thickening and inflamation    Complications: none      12/2/2021 3:14 PM Javy Azevedo M.D.

## 2021-12-02 NOTE — PROGRESS NOTES
ACS BLUE SERVICE PROGRESS NOTE    PREOPERATIVE NOTE: I have seen and examined the patient today.  Critical physical examination findings, laboratory indices, and radiographic studies reviewed.  I recommend to proceed to OR for lap shonna / possible open shonna.    The operative strategy was explained and reviewed. Alternatives discussed. Potential complications including, but not limited to, infection, bleeding, damage to adjacent structures, and anesthetic complications were discussed.     Operative consent signed.  Admission SARS-CoV-2 testing negative. LOW RISK patient. Repeat SARS-CoV-2 testing not indicated. Isolation precautions de-escalated.         ____________________________________     Javy Azevedo M.D.    DD: 12/2/2021  9:01 AM

## 2021-12-02 NOTE — PROGRESS NOTES
Received report from previous shift RN  Assessment complete.  A&O x 4. Patient calls appropriately.  Patient ambulates with SBA assist.   Patient has 2/10 pain. Pain managed with prescribed medications.  Denies N&V. NPO for surgery today.  + void, + flatus, PTA BM.  Patient denies SOB.  CHG complete, plan for surgery at 1045.  Review plan with of care with patient. Call light and personal belongings with in reach. Hourly rounding in place. All needs met at this time.

## 2021-12-02 NOTE — ANESTHESIA PROCEDURE NOTES
Airway    Date/Time: 12/2/2021 1:00 PM  Performed by: Terrence Joyce M.D.  Authorized by: Jasvir Kevin M.D.     Location:  OR  Urgency:  Elective  Indications for Airway Management:  Anesthesia      Spontaneous Ventilation: absent    Sedation Level:  Deep  Preoxygenated: Yes    Patient Position:  Sniffing  Mask Difficulty Assessment:  0 - not attempted  Final Airway Type:  Endotracheal airway  Final Endotracheal Airway:  ETT  Cuffed: Yes    Technique Used for Successful ETT Placement:  Direct laryngoscopy    Insertion Site:  Oral  Blade Type:  Rosalee  Laryngoscope Blade/Videolaryngoscope Blade Size:  3  ETT Size (mm):  7.5  Measured from:  Teeth  ETT to Teeth (cm):  23  Placement Verified by: auscultation and capnometry    Cormack-Lehane Classification:  Grade IIb - view of arytenoids or posterior of glottis only  Number of Attempts at Approach:  1

## 2021-12-02 NOTE — ANESTHESIA PREPROCEDURE EVALUATION
Case: [id number] Date/Time: 12/02/21 1226    Procedures:       CHOLECYSTECTOMY, LAPAROSCOPIC      CHOLECYSTECTOMY - POSSIBLE    Location: [facility] OR 14 / SURGERY [facility]    Surgeons: Javy Azevedo M.D.          Relevant Problems   PULMONARY   (positive) Mild intermittent asthma with exacerbation         (positive) Hepatic steatosis      Other   (positive) Acute respiratory failure with hypoxia (HCC)   (positive) Cholecystitis   (positive) Choledocholithiasis   (positive) Hyperglycemia   (positive) Transaminitis       Physical Exam    Airway   Mallampati: II  TM distance: >3 FB  Neck ROM: full       Cardiovascular - normal exam  Rhythm: regular  Rate: normal  (-) murmur     Dental - normal exam           Pulmonary - normal exam  Breath sounds clear to auscultation     Abdominal    Neurological - normal exam                 Anesthesia Plan    ASA 2       Plan - general       Airway plan will be ETT          Induction: intravenous      Pertinent diagnostic labs and testing reviewed    Informed Consent:    Anesthetic plan and risks discussed with patient.

## 2021-12-02 NOTE — OR NURSING
1507- Pt arrives to PACU from OR on 8L of oxygen via mask. Report received. Dressings to abd CDI. Pt states pain 8/10, medicated per MAR.     1526- Pt wife Nataliya called and updated on pt status and POC.     1600- Report called to Christal YU. POC discussed. Pt states pain is tolerable at this time.     1607- Pt taken to room by CNA on 1L of oxygen via nc. Pt comfortable and dressing CDI upon leaving PACU.

## 2021-12-02 NOTE — CARE PLAN
Problem: Pain - Standard  Goal: Alleviation of pain or a reduction in pain to the patient’s comfort goal  Outcome: Progressing     Problem: Knowledge Deficit - Standard  Goal: Patient and family/care givers will demonstrate understanding of plan of care, disease process/condition, diagnostic tests and medications  Outcome: Progressing       The patient is Stable - Low risk of patient condition declining or worsening    Shift Goals  Clinical Goals: jose kilpatrick in AM, abx  Patient Goals: pain control  Family Goals: No family present at this time    Progress made toward(s) clinical / shift goals: Pt pain alleviated with pr pain medication. POC discussed with pt. Call light within reach and all needs me at this time.

## 2021-12-02 NOTE — PROGRESS NOTES
Hospital Medicine Daily Progress Note    Date of Service  12/2/2021    Chief Complaint  Alec Murrell is a 51 y.o. male admitted 11/28/2021 with   Chief Complaint   Patient presents with   • Epigastric Pain   • Other     [facility] and was told to come to the ER for possible gallbladder issue         Hospital Course  This is 51-year-old male with a past medical history significant for obesity presented to the ER on 11/20/2021 with a complaint of abdominal pain that started 4 weeks ago; continue to get worse progressively.    He has been to urgent care x2, primary care physician's office.  Patient stated that decreased p.o. intake abdominal normal/epigastric pain 10 out of 10 in intensity.  Upon presentation in ER, he was noted to be hypertensive, WBC 12.7 hemoglobin 18.4, platelet 373 sodium 130, AST//29 1507, T-Bili  4.5;   AST/ALT/ALP/T-BILI  265/363/319/7.5    Blood Cx  patient is a started on broad-spectrum antibiotics including Zosyn. US of the abdomen showing Gallbladder sludge and stones with mild wall thickening and positive sonographic Bazzi's sign is suspicious for acute cholecystitis; surgery consulted.    MRI of abdomen showing No discrete signal void within the common bile duct to indicate stone, however heterogeneous decreased T2 signal may indicate sludge or debris. Gi Dr Carlos called who will be coming and evaluating the patient .    Interval Problem Update  Gallbladder issues-liver labs continue to improve. Going for lap shonna today. Minimal abdominal pain and remains afebrile.       I have personally seen and examined the patient at bedside. I discussed the plan of care with patient, family, bedside RN, charge RN, , pharmacy and general surgery and GI.    Consultants/Specialty  general surgery and GI    Code Status  Full Code    Disposition  Patient is not medically cleared.   Anticipate discharge to to home with close outpatient follow-up.  I have placed the  appropriate orders for post-discharge needs.    Review of Systems  Review of Systems   Constitutional: Negative for fever and malaise/fatigue.   HENT: Negative for congestion and sore throat.    Eyes: Negative for blurred vision and double vision.   Respiratory: Negative for cough, hemoptysis, sputum production, shortness of breath and wheezing.    Cardiovascular: Negative for chest pain, palpitations, orthopnea and claudication.   Gastrointestinal: Positive for abdominal pain (RUQ, nearly absent). Negative for heartburn, nausea and vomiting.   Genitourinary: Negative for dysuria.   Neurological: Negative for focal weakness and headaches.   Psychiatric/Behavioral: Negative for depression. The patient is not nervous/anxious.    All other systems reviewed and are negative.       Physical Exam  Temp:  [36 °C (96.8 °F)-36.3 °C (97.3 °F)] 36.1 °C (97 °F)  Pulse:  [58-62] 58  Resp:  [18] 18  BP: (110-121)/(64-75) 121/75  SpO2:  [94 %-97 %] 95 %    Physical Exam  Vitals and nursing note reviewed.   Constitutional:       Appearance: He is obese.   HENT:      Head: Normocephalic and atraumatic.   Eyes:      General: Scleral icterus (further reduction) present.      Extraocular Movements: Extraocular movements intact.      Pupils: Pupils are equal, round, and reactive to light.   Cardiovascular:      Rate and Rhythm: Normal rate.      Pulses: Normal pulses.      Heart sounds: No murmur heard.      Pulmonary:      Effort: Pulmonary effort is normal. No respiratory distress.      Breath sounds: No wheezing.   Abdominal:      General: There is no distension.      Palpations: Abdomen is soft.      Tenderness: There is abdominal tenderness (minimal RUQ). There is no guarding.   Musculoskeletal:      Cervical back: Neck supple.      Right lower leg: No edema.      Left lower leg: No edema.   Skin:     General: Skin is warm.   Neurological:      Mental Status: He is oriented to person, place, and time.      Cranial Nerves: No  cranial nerve deficit.   Psychiatric:         Mood and Affect: Mood normal.         Fluids    Intake/Output Summary (Last 24 hours) at 12/2/2021 0912  Last data filed at 12/2/2021 0830  Gross per 24 hour   Intake 1847.08 ml   Output 1300 ml   Net 547.08 ml       Laboratory  Recent Labs     11/30/21  0312 12/01/21  0336 12/02/21  0232   WBC 7.8 6.8 5.1   RBC 5.57 4.81 4.79   HEMOGLOBIN 16.7 14.4 14.6   HEMATOCRIT 50.2 42.9 42.8   MCV 90.1 89.2 89.4   MCH 30.0 29.9 30.5   MCHC 33.3* 33.6* 34.1   RDW 42.4 42.7 44.0   PLATELETCT 321 379 382   MPV 9.5 9.3 9.4     Recent Labs     11/30/21  0312 12/01/21  0336 12/02/21  0232   SODIUM 135 139 141   POTASSIUM 4.0 3.9 3.7   CHLORIDE 100 105 109   CO2 25 27 22   GLUCOSE 128* 114* 108*   BUN 10 11 9   CREATININE 0.64 0.72 0.83   CALCIUM 9.2 9.2 9.0     Recent Labs     11/29/21  1754   INR 1.15*               Imaging  DX-PORTABLE FLUOROSCOPY < 1 HOUR   Final Result      Portable fluoroscopy utilized for 13 seconds.         INTERPRETING LOCATION: [address] [place] [address], [id number]      [id number]   Final Result      1.  Colonic wall thickening with stones and probable sludge consistent with acute cholecystitis.   2.  No discrete signal void within the common bile duct to indicate stone, however heterogeneous decreased T2 signal may indicate sludge or debris.   3.  No significant biliary dilation.      US-RUQ   Final Result      1.  Gallbladder sludge and stones with mild wall thickening and positive sonographic Bazzi's sign is suspicious for acute cholecystitis.   2.  Hepatic steatosis.      DX-CHEST-PORTABLE (1 VIEW)   Final Result      No evidence of acute cardiopulmonary process.           Assessment/Plan  * Cholecystitis- (present on admission)  Assessment & Plan  Admit patient to surgical floor  Blood Cx X 2  ngtd, General surgery following    -- continue iV abx   -Lap shonna on 12/2, possible discharge tonight or tomorrow AM    Mild intermittent asthma with exacerbation-  (present on admission)  Assessment & Plan  resolved    Acute respiratory failure with hypoxia (HCC)- (present on admission)  Assessment & Plan  resolved    Hepatic steatosis- (present on admission)  Assessment & Plan  Life style modification including diet/ exercise and weight loss     Choledocholithiasis- (present on admission)  Assessment & Plan  No discrete signal void within the common bile duct to indicate stone, however heterogeneous decreased T2 signal may indicate sludge or debris.  -LFT's continue to improve  -ERCP on 11/30, showed small stone and biliary sludge  -empiric IV zosyn continues      Transaminitis- (present on admission)  Assessment & Plan  Likely 2/2 CBD stone  -improving, see below    Hyperglycemia- (present on admission)  Assessment & Plan  A1c 5.8, blood sugars improved  -stop accuchecks  -monitor closely    Morbid obesity (HCC)- (present on admission)  Assessment & Plan  15 minutes spent counseling patient on weight loss, nutrition, and healthy lifestyle         VTE prophylaxis: SCDs/TEDs

## 2021-12-02 NOTE — ANESTHESIA TIME REPORT
Anesthesia Start and Stop Event Times     Date Time Event    12/2/2021 1243 Ready for Procedure     1253 Anesthesia Start     1508 Anesthesia Stop        Responsible Staff  12/02/21    Name Role Begin End    Terrence Joyce M.D. Anesth 1253 1508        Preop Diagnosis (Free Text):  Pre-op Diagnosis     Cholecystitis        Preop Diagnosis (Codes):    Premium Reason  A. 3PM - 7AM    Comments:

## 2021-12-02 NOTE — PROGRESS NOTES
Bedside report received.  Assessment complete.  A&Ox4. Patient calls appropriately.  Patient ambulates with standby assist. Bed alarm off.   Patient has 3/10 pain. Declined intervention at this time.  Skin per flow sheet.  Tolerating clear liquid diet. Pt to be NPO at midnight for procedure in AM. Denies N/V.  + void, - BM. Last BM PTA (11/26).  Reviewed plan of care with patient. Call light and personal belongings within reach. Hourly rounding in place. All needs met at this time.

## 2021-12-02 NOTE — ANESTHESIA POSTPROCEDURE EVALUATION
Patient: Alec Murrell    Procedure Summary     Date: 12/02/21 Room / Location: [place] [address] / SURGERY [facility]    Anesthesia Start: 1253 Anesthesia Stop: 1508    Procedure: CHOLECYSTECTOMY, LAPAROSCOPIC (Abdomen) Diagnosis: (ACUTE CHOLECYSTITIS, CHOLEDOCOLITHIASIS, STATUS POST ERCP AND STONE EXTRACTION)    Surgeons: Javy Azevedo M.D. Responsible Provider: Terrence Joyce M.D.    Anesthesia Type: general ASA Status: 2          Final Anesthesia Type: general  Last vitals  BP   Blood Pressure: 133/69    Temp   36.2 °C (97.2 °F)    Pulse   62   Resp   18    SpO2   98 %      Anesthesia Post Evaluation    Patient location during evaluation: PACU  Patient participation: complete - patient participated  Level of consciousness: awake and alert    Airway patency: patent  Anesthetic complications: no  Cardiovascular status: hemodynamically stable  Respiratory status: acceptable  Hydration status: euvolemic    PONV: none          No complications documented.     Nurse Pain Score: 2 (NPRS)

## 2021-12-03 ENCOUNTER — PHARMACY VISIT (OUTPATIENT)
Dept: PHARMACY | Facility: MEDICAL CENTER | Age: 51
End: 2021-12-03
Payer: COMMERCIAL

## 2021-12-03 VITALS
BODY MASS INDEX: 33.02 KG/M2 | WEIGHT: 198.19 LBS | RESPIRATION RATE: 18 BRPM | DIASTOLIC BLOOD PRESSURE: 76 MMHG | OXYGEN SATURATION: 96 % | HEART RATE: 93 BPM | TEMPERATURE: 97.3 F | HEIGHT: 65 IN | SYSTOLIC BLOOD PRESSURE: 121 MMHG

## 2021-12-03 LAB
ALBUMIN SERPL BCP-MCNC: 3.3 G/DL (ref 3.2–4.9)
ALBUMIN/GLOB SERPL: 1.1 G/DL
ALP SERPL-CCNC: 260 U/L (ref 30–99)
ALT SERPL-CCNC: 107 U/L (ref 2–50)
ANION GAP SERPL CALC-SCNC: 12 MMOL/L (ref 7–16)
AST SERPL-CCNC: 66 U/L (ref 12–45)
BASOPHILS # BLD AUTO: 0.2 % (ref 0–1.8)
BASOPHILS # BLD: 0.02 K/UL (ref 0–0.12)
BILIRUB SERPL-MCNC: 2.1 MG/DL (ref 0.1–1.5)
BUN SERPL-MCNC: 8 MG/DL (ref 8–22)
CALCIUM SERPL-MCNC: 9 MG/DL (ref 8.5–10.5)
CHLORIDE SERPL-SCNC: 105 MMOL/L (ref 96–112)
CO2 SERPL-SCNC: 21 MMOL/L (ref 20–33)
CREAT SERPL-MCNC: 0.63 MG/DL (ref 0.5–1.4)
EOSINOPHIL # BLD AUTO: 0.01 K/UL (ref 0–0.51)
EOSINOPHIL NFR BLD: 0.1 % (ref 0–6.9)
ERYTHROCYTE [DISTWIDTH] IN BLOOD BY AUTOMATED COUNT: 45.2 FL (ref 35.9–50)
GLOBULIN SER CALC-MCNC: 2.9 G/DL (ref 1.9–3.5)
GLUCOSE SERPL-MCNC: 117 MG/DL (ref 65–99)
HCT VFR BLD AUTO: 42.1 % (ref 42–52)
HGB BLD-MCNC: 14.4 G/DL (ref 14–18)
IMM GRANULOCYTES # BLD AUTO: 0.04 K/UL (ref 0–0.11)
IMM GRANULOCYTES NFR BLD AUTO: 0.4 % (ref 0–0.9)
LYMPHOCYTES # BLD AUTO: 2.3 K/UL (ref 1–4.8)
LYMPHOCYTES NFR BLD: 22.9 % (ref 22–41)
MCH RBC QN AUTO: 30.9 PG (ref 27–33)
MCHC RBC AUTO-ENTMCNC: 34.2 G/DL (ref 33.7–35.3)
MCV RBC AUTO: 90.3 FL (ref 81.4–97.8)
MONOCYTES # BLD AUTO: 0.63 K/UL (ref 0–0.85)
MONOCYTES NFR BLD AUTO: 6.3 % (ref 0–13.4)
NEUTROPHILS # BLD AUTO: 7.04 K/UL (ref 1.82–7.42)
NEUTROPHILS NFR BLD: 70.1 % (ref 44–72)
NRBC # BLD AUTO: 0 K/UL
NRBC BLD-RTO: 0 /100 WBC
PLATELET # BLD AUTO: 447 K/UL (ref 164–446)
PMV BLD AUTO: 9.2 FL (ref 9–12.9)
POTASSIUM SERPL-SCNC: 4 MMOL/L (ref 3.6–5.5)
PROT SERPL-MCNC: 6.2 G/DL (ref 6–8.2)
RBC # BLD AUTO: 4.66 M/UL (ref 4.7–6.1)
SODIUM SERPL-SCNC: 138 MMOL/L (ref 135–145)
WBC # BLD AUTO: 10 K/UL (ref 4.8–10.8)

## 2021-12-03 PROCEDURE — 700111 HCHG RX REV CODE 636 W/ 250 OVERRIDE (IP): Performed by: SURGERY

## 2021-12-03 PROCEDURE — 99239 HOSP IP/OBS DSCHRG MGMT >30: CPT | Performed by: INTERNAL MEDICINE

## 2021-12-03 PROCEDURE — 700102 HCHG RX REV CODE 250 W/ 637 OVERRIDE(OP): Performed by: SURGERY

## 2021-12-03 PROCEDURE — 85025 COMPLETE CBC W/AUTO DIFF WBC: CPT

## 2021-12-03 PROCEDURE — A9270 NON-COVERED ITEM OR SERVICE: HCPCS | Performed by: SURGERY

## 2021-12-03 PROCEDURE — 700105 HCHG RX REV CODE 258: Performed by: STUDENT IN AN ORGANIZED HEALTH CARE EDUCATION/TRAINING PROGRAM

## 2021-12-03 PROCEDURE — 80053 COMPREHEN METABOLIC PANEL: CPT

## 2021-12-03 PROCEDURE — 700111 HCHG RX REV CODE 636 W/ 250 OVERRIDE (IP): Performed by: STUDENT IN AN ORGANIZED HEALTH CARE EDUCATION/TRAINING PROGRAM

## 2021-12-03 PROCEDURE — RXMED WILLOW AMBULATORY MEDICATION CHARGE: Performed by: INTERNAL MEDICINE

## 2021-12-03 RX ORDER — OXYCODONE HYDROCHLORIDE 5 MG/1
5 TABLET ORAL EVERY 4 HOURS PRN
Qty: 15 TABLET | Refills: 0 | Status: SHIPPED | OUTPATIENT
Start: 2021-12-03 | End: 2021-12-08

## 2021-12-03 RX ADMIN — CELECOXIB 200 MG: 200 CAPSULE ORAL at 05:16

## 2021-12-03 RX ADMIN — ACETAMINOPHEN 1000 MG: 500 TABLET ORAL at 05:16

## 2021-12-03 RX ADMIN — ENOXAPARIN SODIUM 40 MG: 40 INJECTION SUBCUTANEOUS at 05:16

## 2021-12-03 RX ADMIN — PIPERACILLIN AND TAZOBACTAM 4.5 G: 4; .5 INJECTION, POWDER, LYOPHILIZED, FOR SOLUTION INTRAVENOUS; PARENTERAL at 05:17

## 2021-12-03 ASSESSMENT — PAIN DESCRIPTION - PAIN TYPE
TYPE: ACUTE PAIN

## 2021-12-03 NOTE — PROGRESS NOTES
Received report from Sumaya YU. Pt arrived from PACU with CNA. 1L O2 sating at 95%. x4 lap sites with dressing in place. Pt reports gas pain, able to rest comfortably.Clear liquids today, starting tomorrow adv to low fat regular as tolerated. Wife notified, pt states he will call his son.  Call light in reach, all needs met at this time.

## 2021-12-03 NOTE — CARE PLAN
The patient is Stable - Low risk of patient condition declining or worsening    Shift Goals  Clinical Goals: Surgery today   Patient Goals: pain control  Family Goals: No family present at this time    Progress made toward(s) clinical / shift goals:  Pt went for lap shonna today    Patient is not progressing towards the following goals:

## 2021-12-03 NOTE — CARE PLAN
Problem: Pain - Standard  Goal: Alleviation of pain or a reduction in pain to the patient’s comfort goal  Outcome: Progressing     Problem: Knowledge Deficit - Standard  Goal: Patient and family/care givers will demonstrate understanding of plan of care, disease process/condition, diagnostic tests and medications  Outcome: Progressing       The patient is Stable - Low risk of patient condition declining or worsening    Shift Goals  Clinical Goals: tolerate diet  Patient Goals: pain control  Family Goals: No family present at this time    Progress made toward(s) clinical / shift goals: POC discussed with pt. Pt pain controlled with prn and scheduled medications per MAR.

## 2021-12-03 NOTE — DISCHARGE INSTRUCTIONS
"    Cholecystitis    Cholecystitis is irritation and swelling (inflammation) of the gallbladder. The gallbladder is an organ that is shaped like a pear. It is under the liver on the right side of the body. This organ stores bile. Bile helps the body break down (digest) the fats in food.  This condition can occur all of a sudden. It needs to be treated.  What are the causes?  This condition may be caused by stones or lumps that form in the gallbladder (gallstones). Gallstones can block the tube (duct) that carries bile out of your gallbladder.  Other causes are:  · Damage to the gallbladder due to less blood flow.  · Germs in the bile ducts.  · Scars or kinks in the bile ducts.  · Abnormal growths (tumors) in the liver, pancreas, or gallbladder.  What increases the risk?  You are more likely to develop this condition if:  · You have sickle cell disease.  · You take birth control pills.   · You use estrogen.  · You have alcoholic liver disease.  · You have liver cirrhosis.  · You are being fed through a vein.  · You are very ill.  · You do not eat or drink for a long time. This is also called \"fasting.\"  · You are overweight (obese).  · You lose weight too fast.  · You are pregnant.  · You have high levels of fat in the blood (triglycerides).  · You have irritation and swelling of the pancreas (pancreatitis).  What are the signs or symptoms?  Symptoms of this condition include:  · Pain in the belly (abdomen). Pain is often in the upper right area of the belly.  · Tenderness or bloating in the belly.  · Feeling sick to your stomach (nauseous).  · Throwing up (vomiting).  · Fever.  · Chills.  How is this diagnosed?  This condition may be diagnosed with a medical history and exam. You may also have other tests, such as:  · Imaging tests. This may include:  ? Ultrasound.  ? CT scan of the belly.  ? Nuclear scan. This is also called a HIDA scan. This scan lets your doctor see the bile as it moves in your " body.  ? MRI.  · Blood tests. These are done to check:  ? Your blood count. The white blood cell count may be higher than normal.  ? How well your liver works.  How is this treated?  This condition may be treated with:  · Surgery to take out your gallbladder.  · Antibiotic medicines to treat illnesses caused by germs.  · Going without food for some time.  · Giving fluids through an IV tube.  · Medicines to treat pain or throwing up.  Follow these instructions at home:  · If you had surgery, follow instructions from your doctor about how to care for yourself after you go home.  Medicines    · Take over-the-counter and prescription medicines only as told by your doctor.  · If you were prescribed an antibiotic medicine, take it as told by your doctor. Do not stop taking it even if you start to feel better.  General instructions  · Follow instructions from your doctor about what to eat or drink. Do not eat or drink anything that makes you sick again.  · Do not lift anything that is heavier than 10 lb (4.5 kg) until your doctor says that it is safe.  · Do not use any products that contain nicotine or tobacco, such as cigarettes and e-cigarettes. If you need help quitting, ask your doctor.  · Keep all follow-up visits as told by your doctor. This is important.  Contact a doctor if:  · You have pain and your medicine does not help.  · You have a fever.  Get help right away if:  · Your pain moves to:  ? Another part of your belly.  ? Your back.  · Your symptoms do not go away.  · You have new symptoms.  Summary  · Cholecystitis is swelling and irritation of the gallbladder.  · This condition may be caused by stones or lumps that form in the gallbladder (gallstones).  · Common symptoms are pain in the belly. You may feel sick to your stomach and start throwing up. You may also have a fever and chills.  · This condition may be treated with surgery to take out the gallbladder. It may also be treated with medicines, fasting,  and fluids through an IV tube.  · Follow what you are told about eating and drinking. Do not eat things that make you sick again.  This information is not intended to replace advice given to you by your health care provider. Make sure you discuss any questions you have with your health care provider.  Document Released: 12/06/2012 Document Revised: 04/26/2019 Document Reviewed: 04/26/2019  Elsevier Patient Education © 2020 [employer]    Colecistectomía laparoscópica, cuidados posteriores  Laparoscopic Cholecystectomy, Care After  Danelle esta información sobre cómo cuidarse después del procedimiento. El médico también podrá darle indicaciones más específicas. Si tiene problemas o preguntas, llame al médico.  Siga estas indicaciones en cross casa:  Cuidado de los merida de la cirugía (incisiones)    · Siga las indicaciones del médico en lo que respecta al cuidado de los merida de la cirugía. Chance lo siguiente:  ? Lávese las dereck con agua y jabón antes de cambiar las vendas (vendaje). Use un desinfectante para dereck si no dispone de agua y jabón.  ? Cambie el vendaje krishna se lo haya indicado el médico.  ? No retire los puntos (suturas), la goma para cerrar la piel o las tiras adhesivas. Beni vez deban dejarse puestos en la piel jose miguel 2 semanas o más tiempo. Si las tiras adhesivas se despegan y se enroscan, puede recortar los bordes sueltos. No retire las tiras adhesivas por completo a menos que el médico lo autorice.  · No tome erin de inmersión, no practique natación ni use el jacuzzi hasta que el médico lo autorice. Pregúntele al médico si puede ducharse. Beni vez solo le permitan joseline erin de esponja.  · Controle la darrick alrededor del ayana todos los días para detectar signos de infección. Esté atento a los siguientes signos:  ? Aumento del enrojecimiento, de la hinchazón o del dolor.  ? Más líquido o sachin.  ? Calor.  ? Pus o mal olor.  Actividad  · No conduzca ni use maquinaria pesada mientras aiden analgésicos  recetados.  · No levante ningún objeto que pese más de 10 libras (4,5 kg) hasta que el médico lo autorice.  · No practique deportes de contacto hasta que el médico lo autorice.  · No conduzca jose miguel 24 horas si le dieron un medicamento para ayudarlo a que se relaje (sedante).  · Descanse todo lo que sea necesario. No retome el trabajo ni el estudio hasta que el médico lo autorice.  Instrucciones generales  · Ama los medicamentos de venta randa y los recetados solamente krishna se lo haya indicado el médico.  · A fin de prevenir o tratar el estreñimiento mientras aiden analgésicos recetados, el médico puede recomendarle lo siguiente:  ? Beber suficiente líquido para mantener el pis (orina) antonia o de color amarillo pálido.  ? Padma medicamentos recetados o de venta randa.  ? Consumir alimentos ricos en fibra, krishna frutas y verduras frescas, cereales integrales y frijoles.  ? Limitar el consumo de alimentos con alto contenido de grasas y azúcares procesados, krishna alimentos fritos o dulces.  Comuníquese con un médico si:  · Le aparece toi erupción cutánea.  · Tiene más enrojecimiento, hinchazón o dolor alrededor de los mreida de la cirugía.  · Aumenta la cantidad de líquido o sachin que sale de los merida.  · Los merida de la cirugía se sienten calientes al tacto.  · Tiene pus o percibe mal olor que emana de los merida.  · Tiene fiebre.  · Se abren yuli o más de los merida.  Solicite ayuda de inmediato si:  · Tiene dificultad para respirar.  · Siente dolor en el pecho.  · Siente dolor que empeora en la darrick de los hombros.  · Se desmaya o se siente mareado al ponerse de pie.  · Tiene dolor muy intenso de vientre (abdomen).  · Tiene malestar estomacal (náuseas) que dura más de un día.  · Vomita jose miguel más de un día.  · Siente dolor en la pierna.  Esta información no tiene krishna fin reemplazar el consejo del médico. Asegúrese de hacerle al médico cualquier pregunta que tenga.  Document Released: 08/29/2012 Document Revised:  03/26/2018 Document Reviewed: 06/05/2017  Elsevier Patient Education © 2020 [employer]    Colangiopancreatografía retrógrada endoscópica, cuidados posteriores  Endoscopic Retrograde Cholangiopancreatogram, Care After  Danelle esta información sobre cómo cuidarse después del procedimiento. Cross médico también podrá darle indicaciones más específicas. Comuníquese con cross médico si tiene problemas o preguntas.  ¿Qué puedo esperar después del procedimiento?  Después del procedimiento, es común tener los siguientes síntomas:  · Dolor en la garganta.  · Náuseas.  · Meteorismo.  · Mareos.  · Cansancio (fatiga).  Siga estas indicaciones en cross casa:    · [place] los medicamentos de venta randa y los recetados solamente krishna se lo haya indicado el médico.  · No conduzca jose miguel 24 horas si recibió un medicamento para ayudarlo a relajarse (sedante) jose miguel el procedimiento. Pida a alguien que se quede con usted jose miguel las primeras 24 horas después del procedimiento.  · Retome elizabet actividades normales krishna se lo haya indicado el médico. Pregúntele al médico qué actividades son seguras para usted.  · Vuelva a comer lo que come habitualmente tan pronto krishna se sienta lo suficientemente marcos, o según se lo indique cross médico.  · Concurra a todas las visitas de control krishna se lo haya indicado el médico. [place] es importante.  Comuníquese con un médico si:  · Siente dolor abdominal que no mejora con medicamentos.  · Tiene signos de infección krishna:  ? Escalofríos.  ? No se siente marcos.  Solicite ayuda de inmediato si:  · Tiene dificultad para tragar.  · Cross dolor abdominal, en la garganta o en el pecho empeora.  · Vomita sachin de color yo brillante o toi sustancia parecida a los granos de café.  · La materia fecal es kiara o tiene sachin.  · Tiene fiebre.  · Tiene un aumento repentino de la hinchazón (distensión) en el abdomen.  Resumen  · Después del procedimiento, es normal que se sienta cansado y que sienta molestias en la  garganta.  · Comuníquese con cross médico si tiene signos de toi infección, krishna escalofríos o malestar, o si tiene dolor que no mejora con medicamentos.  · Obtenga ayuda de inmediato si cross dolor empeora, tiene problemas para tragar, vómitos con sachin o negros, heces con sachin o negras, fiebre o un aumento de la hinchazón en el abdomen.  · Concurra a todas las visitas de control krishna se lo haya indicado el médico. [place] es importante.  Esta información no tiene krishna fin reemplazar el consejo del médico. Asegúrese de hacerle al médico cualquier pregunta que tenga.  Document Released: 10/08/2014 Document Revised: 08/13/2018 Document Reviewed: 08/13/2018  [employer] Patient Education © 2020 [employer] Inc.      Discharge Instructions    Discharged to home by car with self. Discharged via walking, hospital escort: Yes.  Special equipment needed: Not Applicable    Be sure to schedule a follow-up appointment with your primary care doctor or any specialists as instructed.     Discharge Plan:   Diet Plan: Discussed  Activity Level: Discussed  Confirmed Follow up Appointment: Patient to Call and Schedule Appointment  Confirmed Symptoms Management: Discussed  Medication Reconciliation Updated: Yes  Influenza Vaccine Indication: Patient Refuses    I understand that a diet low in cholesterol, fat, and sodium is recommended for good health. Unless I have been given specific instructions below for another diet, I accept this instruction as my diet prescription.   Other diet: Low fat    Special Instructions: None    · Is patient discharged on Warfarin / Coumadin?   No     Depression / Suicide Risk    As you are discharged from this [facility] facility, it is important to learn how to keep safe from harming yourself.    Recognize the warning signs:  · Abrupt changes in personality, positive or negative- including increase in energy   · Giving away possessions  · Change in eating patterns- significant weight changes-  positive or  negative  · Change in sleeping patterns- unable to sleep or sleeping all the time   · Unwillingness or inability to communicate  · Depression  · Unusual sadness, discouragement and loneliness  · Talk of wanting to die  · Neglect of personal appearance   · Rebelliousness- reckless behavior  · Withdrawal from people/activities they love  · Confusion- inability to concentrate     If you or a loved one observes any of these behaviors or has concerns about self-harm, here's what you can do:  · Talk about it- your feelings and reasons for harming yourself  · Remove any means that you might use to hurt yourself (examples: pills, rope, extension cords, firearm)  · Get professional help from the community (Mental Health, Substance Abuse, psychological counseling)  · Do not be alone:Call your Safe Contact- someone whom you trust who will be there for you.  · Call your local CRISIS HOTLINE 898-5790 or 686-245-5996  · Call your local Children's Mobile Crisis Response Team [place] (608) 282-7372 or wwwOneSource Virtual  · Call the toll free National Suicide Prevention Hotlines   · National Suicide Prevention Lifeline 821-492-OTZD (1710)  · National Hope Line Network 800-SUICIDE (563-7966)    Laparoscopic Cholecystectomy Discharge Instructions:    1. DIET: Upon discharge from the hospital you may resume your normal preoperative diet. Depending on how you are feeling and whether you have nausea or not, you may wish to stay with a bland diet for the first few days. However, you can advance this as quickly as you feel ready.    2. ACTIVITIES: You have a 15 pound weight restriction for two weeks after surgery.  Routine activities such as bathing, walking, going up and down stairs, and driving* (see below) are safe.  Avoid strenuous activities and exercise that involves twisting, bending, and, running.    3. DRIVING: You may drive whenever you are off pain medications and are able to perform the activities needed to drive, i.e.  turning, bending, twisting, wearing a seat belt, etc.    4. WOUND/BATHING: You may get the wound wet at any time after leaving the hospital. You may shower, but do not submerge in a bath or a pool for at least a week. You may peel off the skin glue using a finger or a pair of tweezers one week after surgery.    5. BOWEL FUNCTION: A few patients, after this operation, will develop either frequent or loose stools after meals. This usually corrects itself after a few days, to a few months.    Much more common than loose stools, is constipation. The combination of pain medication and decreased activity after surgery can cause constipation in otherwise normal patients. If you feel this is occurring, take an over the counter laxative (Milk of Magnesia, Ex-Lax, Senokot, Miralax, Magnesium Citrate, etc.) until the problem has resolved.    6. PAIN MEDICATION: You will be given a prescription for pain medication at discharge. Please take these as directed. It is important to remember not to take medications on an empty stomach as this may cause nausea.  Wean the use of pain medication as tolerated as soon as possible.    7.CALL IF YOU HAVE: (1) Fevers to more than 101F, (2) Unusual chest or leg pain, (3) Drainage or fluid from incision that may be foul smelling, increased tenderness or soreness at the wound or the wound edges are no longer together, redness or swelling at the incision site. Please do not hesitate to call with any other questions.     8. APPOINTMENT: Contact our office at [phone] for a follow-up appointment in 1 to 2 weeks following your procedure.    If you have any additional questions, please do not hesitate to call the office and speak to either myself or the physician on call.    Office address:  [address] [place], [place] [address]e [facility], [address] [place] [address]    Javy Azevedo M.D.  [facility]  [phone]

## 2021-12-03 NOTE — CARE PLAN
The patient is Stable - Low risk of patient condition declining or worsening    Shift Goals  Clinical Goals: Tolerate diet, d/c home   Patient Goals: pain control  Family Goals: No family present at this time    Progress made toward(s) clinical / shift goals:  Pt discharging home, tolerated diet    Patient is not progressing towards the following goals:

## 2021-12-03 NOTE — PROGRESS NOTES
Pt tolerated low fat regular diet, pt had BM this morning. Pain controlled. Meds delivered to bedside.

## 2021-12-03 NOTE — DISCHARGE PLANNING
Meds-to-Beds: Discharge prescription orders listed below delivered to patient's bedside. AIMEE Siegel notified. Patient counseled.      Current Outpatient Medications   Medication Sig Dispense Refill   • oxyCODONE immediate-release (ROXICODONE) 5 MG Tab Take 1 Tablet by mouth every four hours as needed for up to 5 days. 15 Tablet 0      Nupur Raymundo, PharmD

## 2021-12-03 NOTE — PROGRESS NOTES
Discharge orders acknowledged, Pt aware and compliant with new orders, D/C teaching completed, Pt able to verbalize needs and complaint with discharge orders. IV removed.

## 2021-12-03 NOTE — DISCHARGE SUMMARY
"Discharge Summary    CHIEF COMPLAINT ON ADMISSION  Chief Complaint   Patient presents with   • Epigastric Pain   • Other     [facility] and was told to come to the ER for possible gallbladder issue       Reason for Admission  Abdominal Pain      Admission Date  11/28/2021    CODE STATUS  Full Code    HPI & HOSPITAL COURSE  This is a 51 y.o. male here with below medical issues.     This is 51-year-old male with a past medical history significant for obesity presented to the ER on 11/20/2021 with a complaint of abdominal pain that started 4 weeks ago; continue to get worse progressively.    He was found to have an obstructive pattern in his LFT\"s and MRCP was somewhat positive for an CBD stone. GI and General surgery were consulted. Patient was placed empirically on IV zosyn for possible ascending cholangitis. All cultures have remained no growth to date. He underwent ERCP:    FINAL IMPRESSION:    1.  Biliary ampulla, unremarkable appearance.  2.  Pancreatic duct, neither injected nor cannulated.  3.  Common bile duct, normal course and caliber with normal intrahepatic   biliary tree.  Tiny distal stone/filling defect.    He tolerated the procedure well. Liver labs improved. He then underwent the following procedure:    PREOPERATIVE DIAGNOSIS: right upper quadrant pain, cholelithiasis, acute cholecystitis and choledocholithiasis.     POSTOPERATIVE DIAGNOSIS: right upper quadrant pain, gallbladder sludge, acute cholecystitis and choledocholithiasis.     PROCEDURE PERFORMED: laparoscopic cholecystectomy    He tolerated this procedure well. Liver labs continued to improve. Remains afebrile and tolerating an oral diet.    He had mild acute respiratory failure with hypoxia from mild asthma exacerbation which resolved with discontinuation of IV fluids and albuterol inhalers. He is now medically stable to be discharged home.      Therefore, he is discharged in good and stable condition to home with close " outpatient follow-up.    The patient met 2-midnight criteria for an inpatient stay at the time of discharge.    Discharge Date  12/3/2021      FOLLOW UP ITEMS POST DISCHARGE  F/U with Dr Azevedo of general surgery in 1 week  F/U with his PCP in 1-2 weeks    DISCHARGE DIAGNOSES  Principal Problem:    Cholecystitis POA: Yes  Active Problems:    Morbid obesity (HCC) POA: Yes    Hyperglycemia POA: Yes    Transaminitis POA: Yes    Choledocholithiasis POA: Yes    Hepatic steatosis POA: Yes    Acute respiratory failure with hypoxia (HCC) POA: Yes    Mild intermittent asthma with exacerbation POA: Yes  Resolved Problems:    * No resolved hospital problems. *      FOLLOW UP  No future appointments.  No follow-up provider specified.    MEDICATIONS ON DISCHARGE     Medication List      START taking these medications      Instructions   oxyCODONE immediate-release 5 MG Tabs  Commonly known as: ROXICODONE   Take 1 Tablet by mouth every four hours as needed for up to 5 days.  Dose: 5 mg        CONTINUE taking these medications      Instructions   calcium carbonate 500 MG Chew  Commonly known as: TUMS   Chew 500 mg 1 time a day as needed (acid reflux).  Dose: 500 mg     SIMETHICONE-80 PO   Take 1 Capsule by mouth as needed (gas).  Dose: 1 Capsule            Allergies  No Known Allergies    DIET  Orders Placed This Encounter   Procedures   • Diet Order Diet: Full Liquid     Standing Status:   Standing     Number of Occurrences:   1     Order Specific Question:   Diet:     Answer:   Full Liquid [11]       ACTIVITY  As tolerated.  Weight bearing as tolerated    CONSULTATIONS  General surgery, GI    PROCEDURES  As noted above    DX-PORTABLE FLUOROSCOPY < 1 HOUR   Final Result      Portable fluoroscopy utilized for 13 seconds.         INTERPRETING LOCATION: [address] [place] [address], [facility]      [id number]   Final Result      1.  Colonic wall thickening with stones and probable sludge consistent with acute cholecystitis.   2.  No  discrete signal void within the common bile duct to indicate stone, however heterogeneous decreased T2 signal may indicate sludge or debris.   3.  No significant biliary dilation.      US-RUQ   Final Result      1.  Gallbladder sludge and stones with mild wall thickening and positive sonographic Bazzi's sign is suspicious for acute cholecystitis.   2.  Hepatic steatosis.      DX-CHEST-PORTABLE (1 VIEW)   Final Result      No evidence of acute cardiopulmonary process.            LABORATORY  Lab Results   Component Value Date    SODIUM 138 12/03/2021    POTASSIUM 4.0 12/03/2021    CHLORIDE 105 12/03/2021    CO2 21 12/03/2021    GLUCOSE 117 (H) 12/03/2021    BUN 8 12/03/2021    CREATININE 0.63 12/03/2021        Lab Results   Component Value Date    WBC 10.0 12/03/2021    HEMOGLOBIN 14.4 12/03/2021    HEMATOCRIT 42.1 12/03/2021    PLATELETCT 447 (H) 12/03/2021        Total time of the discharge process exceeds 34 minutes.

## 2021-12-03 NOTE — PROGRESS NOTES
Received report from previous shift RN  Assessment complete.  A&O x 4. Patient calls appropriately.  Patient ambulates with SBA assist.   Patient has 2/10 pain. Pain managed with prescribed medications.  Denies N&V.tolerated full liquids, advanced to regular low fat.  + void, + flatus, PTA BM.  x4 lap sites with dressing in place  Patient denies SOB.  Review plan with of care with patient. Call light and personal belongings with in reach. Hourly rounding in place. All needs met at this time.

## 2021-12-03 NOTE — PROGRESS NOTES
Bedside report received.  Assessment complete.  A&Ox4. Patient calls appropriately.  Patient ambulates with standby assist. Bed alarm off.   Patient has 6/10 pain. Declined intervention at this time.  Tolerating clear liquid diet. Denies N/V.  + void, + BM. Last BM 12/2.  Reviewed plan of care with patient. Call light and personal belongings within reach. Hourly rounding in place. All needs met at this time.

## (undated) DEVICE — GLOVE BIOGEL SZ 7.5 SURGICAL PF LTX - (50PR/BX 4BX/CA)

## (undated) DEVICE — CLOSURE WOUND 1/4 X 4 (STERI - STRIP) (50/BX 4BX/CA)

## (undated) DEVICE — PAD LAP STERILE 18 X 18 - (5/PK 40PK/CA)

## (undated) DEVICE — SYRINGE DISP. 60 CC LL - (30/BX, 12BX/CA)**WHEN THESE ARE GONE ORDER #500206**

## (undated) DEVICE — CLIP MED INTNL HRZN TI ESCP - (25/BX)

## (undated) DEVICE — CLIP LG INTNL HRZN TI ESCP LGT - (20/BX)

## (undated) DEVICE — ENDOLOOP 0 VICRYL - (12/BX)

## (undated) DEVICE — GUIDE JAGWIRE 035 STRAIGHT (2EA/BX)

## (undated) DEVICE — BALLOON RETRIEVAL EXTRACTOR PRO RX   9-12MM

## (undated) DEVICE — KIT ANESTHESIA W/CIRCUIT & 3/LT BAG W/FILTER (20EA/CA)

## (undated) DEVICE — STERI STRIP COMPOUND BENZOIN - TINCTURE 0.6ML WITH APPLICATOR (40EA/BX)

## (undated) DEVICE — EXTRACTOR PRO XL 9-12 MM ABOVE

## (undated) DEVICE — PACK MAJOR BASIN - (2EA/CA)

## (undated) DEVICE — SPONGE GAUZESTER. 2X2 4-PL - (2/PK 50PK/BX 30BX/CS)

## (undated) DEVICE — SENSOR SPO2 NEO LNCS ADHESIVE (20/BX) SEE USER NOTES

## (undated) DEVICE — BAG RETRIEVAL 10ML (10EA/BX)

## (undated) DEVICE — SET SUCTION/IRRIGATION WITH DISPOSABLE TIP (6/CA )PART #0250-070-520 IS A SUB

## (undated) DEVICE — CHLORAPREP 26 ML APPLICATOR - ORANGE TINT(25/CA)

## (undated) DEVICE — FILM CASSETTE ENDO

## (undated) DEVICE — PROTECTOR ULNA NERVE - (36PR/CA)

## (undated) DEVICE — ELECTRODE DUAL RETURN W/ CORD - (50/PK)

## (undated) DEVICE — CANNULA W/ SUPPLY TUBING O2 - (50/CA)

## (undated) DEVICE — ELECTRODE 5MM LHK LAPSCP STERILE DISP- MEGADYNE  (5/CA)

## (undated) DEVICE — GLOVE BIOGEL ECLIPSE PF LATEX SIZE 7.5

## (undated) DEVICE — GLOVE BIOGEL INDICATOR SZ 8 SURGICAL PF LTX - (50/BX 4BX/CA)

## (undated) DEVICE — CANISTER SUCTION 3000ML MECHANICAL FILTER AUTO SHUTOFF MEDI-VAC NONSTERILE LF DISP  (40EA/CA)

## (undated) DEVICE — SUTURE 2-0 VICRYL PLUS CT-2 - 27 INCH (36/BX)

## (undated) DEVICE — BOVIE  BLADE 6 EXTENDED - (50/PK)

## (undated) DEVICE — GLOVE BIOGEL PI ORTHO SZ 7.5 PF LF (40PR/BX)

## (undated) DEVICE — SET EXTENSION WITH 2 PORTS (48EA/CA) ***PART #2C8610 IS A SUBSTITUTE*****

## (undated) DEVICE — SUCTION INSTRUMENT YANKAUER BULBOUS TIP W/O VENT (50EA/CA)

## (undated) DEVICE — PACK LAP CHOLE OR - (2EA/CA)

## (undated) DEVICE — KIT CUSTOM PROCEDURE SINGLE FOR ENDO  (15/CA)

## (undated) DEVICE — MASK ANESTHESIA ADULT  - (100/CA)

## (undated) DEVICE — DRESSING TRANSPARENT FILM TEGADERM 2.375 X 2.75"  (100EA/BX)"

## (undated) DEVICE — DRAPE LAPAROTOMY T SHEET - (12EA/CA)

## (undated) DEVICE — SODIUM CHL IRRIGATION 0.9% 1000ML (12EA/CA)

## (undated) DEVICE — TOWEL STOP TIMEOUT SAFETY FLAG (40EA/CA)

## (undated) DEVICE — GOWN WARMING STANDARD FLEX - (30/CA)

## (undated) DEVICE — NEPTUNE 4 PORT MANIFOLD - (20/PK)

## (undated) DEVICE — CLOSURE SKIN STRIP 1/2 X 4 IN - (STERI STRIP) (50/BX 4BX/CA)

## (undated) DEVICE — TUBE CONNECT SUCTION CLEAR 120 X 1/4" (50EA/CA)"

## (undated) DEVICE — LAPAROSCOPIC APPLICATOR BELLOW HEMOBLAST (12EA/CA)

## (undated) DEVICE — SPONGE GAUZESTER 4 X 4 4PLY - (128PK/CA)

## (undated) DEVICE — SLEEVE, VASO, THIGH, MED

## (undated) DEVICE — CLIP MED LG INTNL HRZN TI ESCP - (20/BX)

## (undated) DEVICE — STAPLER SKIN DISP - (6/BX 10BX/CA) VISISTAT

## (undated) DEVICE — TROCAR Z THREAD 11 X 100 - BLADED (6/BX)

## (undated) DEVICE — SHEET TRANSVERSE LAP - (12EA/CA)

## (undated) DEVICE — TUBING CLEARLINK DUO-VENT - C-FLO (48EA/CA)

## (undated) DEVICE — AGENT HEMOSTATIC BELLOW HEMOBLAST (12EA/CA)

## (undated) DEVICE — TROCAR 5X100 BLADED Z-THREAD - KII (6/BX)

## (undated) DEVICE — SET LEADWIRE 5 LEAD BEDSIDE DISPOSABLE ECG (1SET OF 5/EA)

## (undated) DEVICE — BITE BLOCK ADULT 60FR (100EA/CA)

## (undated) DEVICE — SPHINCTEROTOME CLEVER CUT

## (undated) DEVICE — SUTURE 4-0 VICRYL PLUS FS-2 - 27 INCH (36/BX)

## (undated) DEVICE — CANNULA W/SEAL 5X100 Z-THRE - ADED KII (12/BX)

## (undated) DEVICE — SET TUBING PNEUMOCLEAR HIGH FLOW SMOKE EVACUATION (10EA/BX)

## (undated) DEVICE — HEAD HOLDER JUNIOR/ADULT

## (undated) DEVICE — SPONGE PEANUT - (5/PK 50PK/CA)

## (undated) DEVICE — ELECTRODE 850 FOAM ADHESIVE - HYDROGEL RADIOTRNSPRNT (50/PK)

## (undated) DEVICE — SUTURE 0 COATED VICRYL 6-18IN - (12PK/BX)

## (undated) DEVICE — CONTAINER, SPECIMEN, STERILE

## (undated) DEVICE — TUBE E-T HI-LO CUFF 7.0MM (10EA/PK)

## (undated) DEVICE — LACTATED RINGERS INJ 1000 ML - (14EA/CA 60CA/PF)

## (undated) DEVICE — SUTURE GENERAL